# Patient Record
Sex: MALE | Race: BLACK OR AFRICAN AMERICAN | NOT HISPANIC OR LATINO | Employment: FULL TIME | URBAN - METROPOLITAN AREA
[De-identification: names, ages, dates, MRNs, and addresses within clinical notes are randomized per-mention and may not be internally consistent; named-entity substitution may affect disease eponyms.]

---

## 2018-10-08 ENCOUNTER — HOSPITAL ENCOUNTER (EMERGENCY)
Facility: HOSPITAL | Age: 38
Discharge: HOME | End: 2018-10-08
Attending: EMERGENCY MEDICINE | Admitting: EMERGENCY MEDICINE

## 2018-10-08 VITALS
HEART RATE: 70 BPM | RESPIRATION RATE: 16 BRPM | SYSTOLIC BLOOD PRESSURE: 160 MMHG | WEIGHT: 135 LBS | DIASTOLIC BLOOD PRESSURE: 88 MMHG | BODY MASS INDEX: 23.05 KG/M2 | OXYGEN SATURATION: 98 % | TEMPERATURE: 98.9 F | HEIGHT: 64 IN

## 2018-10-08 DIAGNOSIS — F10.20 ALCOHOLISM (HCC): ICD-10-CM

## 2018-10-08 DIAGNOSIS — F41.1 ANXIETY REACTION: Primary | ICD-10-CM

## 2018-10-08 LAB
ALBUMIN SERPL-MCNC: 4.42 G/DL (ref 3.2–4.8)
ALBUMIN/GLOB SERPL: 1.6 G/DL (ref 1.5–2.5)
ALP SERPL-CCNC: 56 U/L (ref 25–100)
ALT SERPL W P-5'-P-CCNC: 23 U/L (ref 7–40)
AMPHET+METHAMPHET UR QL: NEGATIVE
AMPHETAMINES UR QL: NEGATIVE
ANION GAP SERPL CALCULATED.3IONS-SCNC: 9 MMOL/L (ref 3–11)
AST SERPL-CCNC: 42 U/L (ref 0–33)
BARBITURATES UR QL SCN: NEGATIVE
BASOPHILS # BLD AUTO: 0.02 10*3/MM3 (ref 0–0.2)
BASOPHILS NFR BLD AUTO: 0.3 % (ref 0–1)
BENZODIAZ UR QL SCN: NEGATIVE
BILIRUB SERPL-MCNC: 0.9 MG/DL (ref 0.3–1.2)
BILIRUB UR QL STRIP: NEGATIVE
BUN BLD-MCNC: 9 MG/DL (ref 9–23)
BUN/CREAT SERPL: 9.9 (ref 7–25)
BUPRENORPHINE SERPL-MCNC: NEGATIVE NG/ML
CALCIUM SPEC-SCNC: 8.7 MG/DL (ref 8.7–10.4)
CANNABINOIDS SERPL QL: NEGATIVE
CHLORIDE SERPL-SCNC: 105 MMOL/L (ref 99–109)
CLARITY UR: CLEAR
CO2 SERPL-SCNC: 26 MMOL/L (ref 20–31)
COCAINE UR QL: NEGATIVE
COLOR UR: YELLOW
CREAT BLD-MCNC: 0.91 MG/DL (ref 0.6–1.3)
DEPRECATED RDW RBC AUTO: 43.2 FL (ref 37–54)
EOSINOPHIL # BLD AUTO: 0.02 10*3/MM3 (ref 0–0.3)
EOSINOPHIL NFR BLD AUTO: 0.3 % (ref 0–3)
ERYTHROCYTE [DISTWIDTH] IN BLOOD BY AUTOMATED COUNT: 12.2 % (ref 11.3–14.5)
ETHANOL BLD-MCNC: 12 MG/DL (ref 0–10)
GFR SERPL CREATININE-BSD FRML MDRD: 115 ML/MIN/1.73
GLOBULIN UR ELPH-MCNC: 2.8 GM/DL
GLUCOSE BLD-MCNC: 79 MG/DL (ref 70–100)
GLUCOSE UR STRIP-MCNC: ABNORMAL MG/DL
HCT VFR BLD AUTO: 42.5 % (ref 38.9–50.9)
HGB BLD-MCNC: 13.8 G/DL (ref 13.1–17.5)
HGB UR QL STRIP.AUTO: NEGATIVE
IMM GRANULOCYTES # BLD: 0.01 10*3/MM3 (ref 0–0.03)
IMM GRANULOCYTES NFR BLD: 0.2 % (ref 0–0.6)
KETONES UR QL STRIP: NEGATIVE
LEUKOCYTE ESTERASE UR QL STRIP.AUTO: NEGATIVE
LYMPHOCYTES # BLD AUTO: 1.85 10*3/MM3 (ref 0.6–4.8)
LYMPHOCYTES NFR BLD AUTO: 31.7 % (ref 24–44)
MAGNESIUM SERPL-MCNC: 1.7 MG/DL (ref 1.3–2.7)
MCH RBC QN AUTO: 31.3 PG (ref 27–31)
MCHC RBC AUTO-ENTMCNC: 32.5 G/DL (ref 32–36)
MCV RBC AUTO: 96.4 FL (ref 80–99)
METHADONE UR QL SCN: NEGATIVE
MONOCYTES # BLD AUTO: 0.37 10*3/MM3 (ref 0–1)
MONOCYTES NFR BLD AUTO: 6.3 % (ref 0–12)
NEUTROPHILS # BLD AUTO: 3.56 10*3/MM3 (ref 1.5–8.3)
NEUTROPHILS NFR BLD AUTO: 61.2 % (ref 41–71)
NITRITE UR QL STRIP: NEGATIVE
OPIATES UR QL: NEGATIVE
OXYCODONE UR QL SCN: NEGATIVE
PCP UR QL SCN: NEGATIVE
PH UR STRIP.AUTO: 6.5 [PH] (ref 5–8)
PLATELET # BLD AUTO: 183 10*3/MM3 (ref 150–450)
PMV BLD AUTO: 9.7 FL (ref 6–12)
POTASSIUM BLD-SCNC: 3.9 MMOL/L (ref 3.5–5.5)
PROPOXYPH UR QL: NEGATIVE
PROT SERPL-MCNC: 7.2 G/DL (ref 5.7–8.2)
PROT UR QL STRIP: NEGATIVE
RBC # BLD AUTO: 4.41 10*6/MM3 (ref 4.2–5.76)
SODIUM BLD-SCNC: 140 MMOL/L (ref 132–146)
SP GR UR STRIP: 1.02 (ref 1–1.03)
T4 FREE SERPL-MCNC: 1.01 NG/DL (ref 0.89–1.76)
TRICYCLICS UR QL SCN: NEGATIVE
TROPONIN I SERPL-MCNC: 0.02 NG/ML
TSH SERPL DL<=0.05 MIU/L-ACNC: 1.71 MIU/ML (ref 0.35–5.35)
UROBILINOGEN UR QL STRIP: ABNORMAL
VIT B12 BLD-MCNC: 313 PG/ML (ref 211–911)
WBC NRBC COR # BLD: 5.83 10*3/MM3 (ref 3.5–10.8)

## 2018-10-08 PROCEDURE — 82607 VITAMIN B-12: CPT | Performed by: EMERGENCY MEDICINE

## 2018-10-08 PROCEDURE — 84443 ASSAY THYROID STIM HORMONE: CPT | Performed by: EMERGENCY MEDICINE

## 2018-10-08 PROCEDURE — 81003 URINALYSIS AUTO W/O SCOPE: CPT | Performed by: EMERGENCY MEDICINE

## 2018-10-08 PROCEDURE — 83735 ASSAY OF MAGNESIUM: CPT | Performed by: EMERGENCY MEDICINE

## 2018-10-08 PROCEDURE — 84439 ASSAY OF FREE THYROXINE: CPT | Performed by: EMERGENCY MEDICINE

## 2018-10-08 PROCEDURE — 93005 ELECTROCARDIOGRAM TRACING: CPT | Performed by: EMERGENCY MEDICINE

## 2018-10-08 PROCEDURE — 85025 COMPLETE CBC W/AUTO DIFF WBC: CPT | Performed by: EMERGENCY MEDICINE

## 2018-10-08 PROCEDURE — 99283 EMERGENCY DEPT VISIT LOW MDM: CPT

## 2018-10-08 PROCEDURE — 96360 HYDRATION IV INFUSION INIT: CPT

## 2018-10-08 PROCEDURE — 80307 DRUG TEST PRSMV CHEM ANLYZR: CPT | Performed by: EMERGENCY MEDICINE

## 2018-10-08 PROCEDURE — 80306 DRUG TEST PRSMV INSTRMNT: CPT | Performed by: EMERGENCY MEDICINE

## 2018-10-08 PROCEDURE — 84484 ASSAY OF TROPONIN QUANT: CPT | Performed by: EMERGENCY MEDICINE

## 2018-10-08 PROCEDURE — 80053 COMPREHEN METABOLIC PANEL: CPT | Performed by: EMERGENCY MEDICINE

## 2018-10-08 RX ORDER — SODIUM CHLORIDE 0.9 % (FLUSH) 0.9 %
10 SYRINGE (ML) INJECTION AS NEEDED
Status: DISCONTINUED | OUTPATIENT
Start: 2018-10-08 | End: 2018-10-08 | Stop reason: HOSPADM

## 2018-10-08 RX ADMIN — SODIUM CHLORIDE 1000 ML: 9 INJECTION, SOLUTION INTRAVENOUS at 08:44

## 2018-10-08 NOTE — DISCHARGE INSTRUCTIONS
[facility]  [address] [place] [address]  [phone]      [facility]    [address]  [address]  [place] [address]  [phone] [address]  [address]  [place] [address]  [phone]      [facility] ([place] BCBS, UK HMO, UK PPO, UK EPO, UK Student Ins, Medicare and Medicaid)    [address]  3rd [place] [address]  [phone]       [facility] (4 locations) Accepts most insurance and Medicaid ([place] location only) No Medicare, [place] or [place]    [facility]  [address] [place] [address]  [phone]       [facility]  [address]  [address]  [place] [address]  [phone]      [facility]  [address]  [place] [address]  [phone]                     [facility]  [address]   [place], [place] [address] [phone]       [facility] (Preferred Medicaid Provider and some Major Insurance Plans) [phone]    [facility]  [address] [place] [address]       [facility]  [address] [place] [address]       [place]  [address] [place] [address]       [facility] (Most insurance and Medicaid)  [address]  [address]nd [place] [address]  [phone]      [facility]    [address]  [place] [address]  [phone]      [address] [facility]  [address]  [place] [address]       [facility]  [address] [place] [address] [phone]       [facility] 24-Hour Help Line [phone]      [facility]  [address] [address]  [phone]             [facility]  [address] [address]  [phone]             [facility]  [address] [id number]  [phone]                [facility]  [address] [facility] [address]  [place], [place] [address] [phone]       [facility]  [address] [place], [place] [address] [phone]      [facility]  [address] [place] [address]  [phone]              [facility]  [address]  [place], [place] [address] [phone]                  [facility]  [address] [place] [address] [phone]      [facility]  [address] [place] [address] [phone]                  [facility]  [address] [place] [address] [phone]      [facility]  [address] [place] [address] [phone]       [facility]  [address] [place] [address] [phone]      [facility]  [address] [place] [address] [phone]       [facility]  [address] [place] [address] [phone]

## 2018-10-08 NOTE — ED PROVIDER NOTES
"Subjective   James Swanson is a 37 y.o.male who presents to the ED with complaints of panic attacks. The patient says that he drinks approximately 4 twenty-five ounce beers every night. For the past couple of nights, he has been having panic attacks. During these episodes, he endorses shaking and breaking out into a cold sweat. Because of his associated drinking, his episodes are starting to frighten him. The patient  has never had these episodes before. He says he has been drinking this volume of beers nightly for several years in order to fall asleep after work. He indicates that he has nightmares and difficulty sleeping if he does not drink. Yesterday, he only drank one to two beers, but he says prior to that he had been drinking normally. Of note, he has recently switched from \"Bud Ice\" to \"Edge\" that contains 8 % alcohol within the past two months. He says his episodes are worsening. He also complains of having some left sided chest pain, but he denies any vomiting. He has no health problems and denies any history of depression. He says he has been stressed recently with the start of a new job. There are no other acute complaints at this time.         History provided by:  Patient  Illness   Location:  Generalized  Quality:  Panic attacks  Severity:  Moderate  Onset quality:  Gradual  Duration:  3 days  Timing:  Intermittent  Progression:  Worsening  Chronicity:  New  Context:  He drinks 100 ounces of beer a day.   Relieved by:  None tried  Worsened by:  Nothing  Ineffective treatments:  None tried  Associated symptoms: chest pain    Associated symptoms: no vomiting    Risk factors:  Alcoholism      Review of Systems   Constitutional: Positive for diaphoresis (during panic episodes).   Cardiovascular: Positive for chest pain.   Gastrointestinal: Negative for vomiting.   Neurological: Positive for tremors.   Psychiatric/Behavioral: The patient is nervous/anxious.    All other systems reviewed and are " negative.      History reviewed. No pertinent past medical history.    No Known Allergies    History reviewed. No pertinent surgical history.    History reviewed. No pertinent family history.    Social History     Social History   • Marital status: Single     Social History Main Topics   • Smoking status: Light Tobacco Smoker     Types: Cigars   • Smokeless tobacco: Never Used   • Drug use: No   • Sexual activity: Defer     Other Topics Concern   • Not on file         Objective   Physical Exam   Constitutional: He is oriented to person, place, and time. He appears well-developed and well-nourished. No distress.   HENT:   Head: Normocephalic and atraumatic.   Nose: Nose normal.   Eyes: Conjunctivae are normal. No scleral icterus.   Neck: Normal range of motion. Neck supple.   Cardiovascular: Normal rate, regular rhythm and normal heart sounds.    No murmur heard.  Pulmonary/Chest: Effort normal and breath sounds normal. No respiratory distress.   Abdominal: Soft. Bowel sounds are normal. There is no tenderness.   Musculoskeletal: Normal range of motion. He exhibits no edema.   Neurological: He is alert and oriented to person, place, and time.   The patient is tremulous.    Skin: Skin is warm and dry.   Psychiatric: His behavior is normal. His mood appears anxious.   Nursing note and vitals reviewed.      Procedures         ED Course     EKG SR.  Labs benign.  Better after fluids.  Patient stable on serial rechecks.  Discussed alcohol abuse and self-medication.  Discussed findings, concerns, plan of care, expected course, reasons to return and followup.                  MDM  Number of Diagnoses or Management Options  Alcoholism (CMS/[facility]):   Anxiety reaction:      Amount and/or Complexity of Data Reviewed  Clinical lab tests: ordered and reviewed  Independent visualization of images, tracings, or specimens: yes        Final diagnoses:   Anxiety reaction   Alcoholism (CMS/HCC)       Documentation assistance provided by  sergei Garcia.  Information recorded by the amnaibmariana was done at my direction and has been verified and validated by me.     Ke Garcia  10/08/18 0738       Ke Garcia  10/08/18 1115       Lee Mosley MD  10/08/18 1335

## 2020-12-30 ENCOUNTER — HOSPITAL ENCOUNTER (OUTPATIENT)
Dept: HOSPITAL 49 - FAS | Age: 40
Discharge: HOME | End: 2020-12-30
Payer: COMMERCIAL

## 2020-12-30 DIAGNOSIS — G47.00: ICD-10-CM
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
## 2020-12-30 DIAGNOSIS — F17.210: ICD-10-CM

## 2020-12-30 DIAGNOSIS — S00.05XA: Primary | ICD-10-CM

## 2020-12-30 DIAGNOSIS — K21.9: ICD-10-CM

## 2021-01-09 ENCOUNTER — HOSPITAL ENCOUNTER (EMERGENCY)
Dept: HOSPITAL 49 - FER | Age: 41
Discharge: HOME | End: 2021-01-09
Payer: COMMERCIAL

## 2021-01-09 DIAGNOSIS — Z88.0: ICD-10-CM

## 2021-01-09 DIAGNOSIS — L76.32: Primary | ICD-10-CM

## 2021-01-09 DIAGNOSIS — F17.200: ICD-10-CM
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
## 2021-07-07 ENCOUNTER — HOSPITAL ENCOUNTER (EMERGENCY)
Dept: HOSPITAL 49 - FER | Age: 41
Discharge: HOME | End: 2021-07-07
Payer: SELF-PAY

## 2021-07-07 DIAGNOSIS — F17.290: ICD-10-CM

## 2021-07-07 DIAGNOSIS — R42: ICD-10-CM

## 2021-07-07 DIAGNOSIS — R00.2: ICD-10-CM

## 2021-07-07 DIAGNOSIS — R06.02: ICD-10-CM

## 2021-07-07 DIAGNOSIS — Z88.0: ICD-10-CM

## 2021-07-07 DIAGNOSIS — R11.2: ICD-10-CM

## 2021-07-07 DIAGNOSIS — R07.89: Primary | ICD-10-CM

## 2021-07-07 LAB
ALBUMIN SERPL-MCNC: 3.9 G/DL (ref 3.4–5)
ALKALINE PHOSHATASE: 64 U/L (ref 46–116)
ALT SERPL-CCNC: 90 U/L (ref 16–63)
AST: 82 U/L (ref 15–37)
BASOPHIL: 1 % (ref 0–2)
BILIRUBIN - TOTAL: 0.5 MG/DL (ref 0.2–1)
BUN SERPL-MCNC: 9 MG/DL (ref 7–18)
BUN/CREAT RATIO (CALC): 10.2 RATIO
CHLORIDE: 99 MMOL/L (ref 98–107)
CO2 (BICARBONATE): 28 MMOL/L (ref 21–32)
CREATININE: 0.88 MG/DL (ref 0.67–1.17)
EOSINOPHIL: 1.8 % (ref 0–5)
GLOBULIN (CALCULATION): 4.3 G/DL
GLUCOSE SERPL-MCNC: 96 MG/DL (ref 74–106)
HCT: 38.5 % (ref 42–52)
HGB BLD-MCNC: 12.7 G/DL (ref 13.2–18)
LYMPHOCYTE: 47 % (ref 15–48)
MCH RBC QN AUTO: 31.8 PG (ref 25–31)
MCHC RBC AUTO-ENTMCNC: 33 G/DL (ref 32–36)
MCV: 96.3 FL (ref 78–100)
MONOCYTE: 10.7 % (ref 0–12)
MPV: 10.1 FL (ref 6–9.5)
NEUTROPHIL: 39.3 % (ref 41–80)
NRBC: 0
PLT: 144 K/UL (ref 150–400)
POTASSIUM: 3.5 MMOL/L (ref 3.5–5.1)
RBC MORPHOLOGY: NORMAL
RBC: 4 M/UL (ref 4.7–6)
RDW: 12.7 % (ref 11.5–14)
TOTAL PROTEIN: 8.2 G/DL (ref 6.4–8.2)
WBC: 5.1 K/UL (ref 4–10.5)

## 2021-08-08 ENCOUNTER — HOSPITAL ENCOUNTER (INPATIENT)
Facility: HOSPITAL | Age: 41
LOS: 4 days | Discharge: HOME | End: 2021-08-12
Attending: PSYCHIATRY & NEUROLOGY | Admitting: PSYCHIATRY & NEUROLOGY

## 2021-08-08 ENCOUNTER — HOSPITAL ENCOUNTER (EMERGENCY)
Dept: HOSPITAL 49 - FER | Age: 41
Discharge: LEFT BEFORE DISCHARGE | End: 2021-08-08
Payer: SELF-PAY

## 2021-08-08 DIAGNOSIS — Z53.1: Primary | ICD-10-CM

## 2021-08-08 PROBLEM — F29 PSYCHOSIS (HCC): Status: ACTIVE | Noted: 2021-08-08

## 2021-08-08 PROCEDURE — 63710000001 DIPHENHYDRAMINE PER 50 MG: Performed by: PSYCHIATRY & NEUROLOGY

## 2021-08-08 PROCEDURE — 25010000002 LORAZEPAM PER 2 MG: Performed by: PSYCHIATRY & NEUROLOGY

## 2021-08-08 PROCEDURE — 25010000002 DIPHENHYDRAMINE PER 50 MG: Performed by: PSYCHIATRY & NEUROLOGY

## 2021-08-08 PROCEDURE — 25010000002 HALOPERIDOL LACTATE PER 5 MG: Performed by: PSYCHIATRY & NEUROLOGY

## 2021-08-08 RX ORDER — ACETAMINOPHEN 325 MG/1
650 TABLET ORAL EVERY 4 HOURS PRN
Status: DISCONTINUED | OUTPATIENT
Start: 2021-08-08 | End: 2021-08-12 | Stop reason: HOSPADM

## 2021-08-08 RX ORDER — HYDROXYZINE HYDROCHLORIDE 25 MG/1
100 TABLET, FILM COATED ORAL NIGHTLY PRN
Status: DISCONTINUED | OUTPATIENT
Start: 2021-08-08 | End: 2021-08-12 | Stop reason: HOSPADM

## 2021-08-08 RX ORDER — DIPHENHYDRAMINE HYDROCHLORIDE 50 MG/ML
50 INJECTION INTRAMUSCULAR; INTRAVENOUS ONCE
Status: DISCONTINUED | OUTPATIENT
Start: 2021-08-08 | End: 2021-08-12 | Stop reason: HOSPADM

## 2021-08-08 RX ORDER — DIPHENHYDRAMINE HCL 50 MG
50 CAPSULE ORAL EVERY 4 HOURS PRN
Status: DISCONTINUED | OUTPATIENT
Start: 2021-08-08 | End: 2021-08-12 | Stop reason: HOSPADM

## 2021-08-08 RX ORDER — LORAZEPAM 2 MG/ML
2 INJECTION INTRAMUSCULAR EVERY 4 HOURS PRN
Status: DISCONTINUED | OUTPATIENT
Start: 2021-08-08 | End: 2021-08-12 | Stop reason: HOSPADM

## 2021-08-08 RX ORDER — HALOPERIDOL 5 MG/1
5 TABLET ORAL EVERY 4 HOURS PRN
Status: DISCONTINUED | OUTPATIENT
Start: 2021-08-08 | End: 2021-08-12 | Stop reason: HOSPADM

## 2021-08-08 RX ORDER — RISPERIDONE 2 MG/1
2 TABLET ORAL 2 TIMES DAILY
Status: DISCONTINUED | OUTPATIENT
Start: 2021-08-08 | End: 2021-08-11

## 2021-08-08 RX ORDER — LOPERAMIDE HYDROCHLORIDE 2 MG/1
2 CAPSULE ORAL
Status: DISCONTINUED | OUTPATIENT
Start: 2021-08-08 | End: 2021-08-12 | Stop reason: HOSPADM

## 2021-08-08 RX ORDER — LORAZEPAM 2 MG/1
2 TABLET ORAL EVERY 4 HOURS PRN
Status: DISCONTINUED | OUTPATIENT
Start: 2021-08-08 | End: 2021-08-12 | Stop reason: HOSPADM

## 2021-08-08 RX ORDER — ALUMINA, MAGNESIA, AND SIMETHICONE 2400; 2400; 240 MG/30ML; MG/30ML; MG/30ML
15 SUSPENSION ORAL EVERY 6 HOURS PRN
Status: DISCONTINUED | OUTPATIENT
Start: 2021-08-08 | End: 2021-08-12 | Stop reason: HOSPADM

## 2021-08-08 RX ORDER — HALOPERIDOL 5 MG/ML
5 INJECTION INTRAMUSCULAR EVERY 4 HOURS PRN
Status: DISCONTINUED | OUTPATIENT
Start: 2021-08-08 | End: 2021-08-12 | Stop reason: HOSPADM

## 2021-08-08 RX ORDER — HYDROXYZINE PAMOATE 50 MG/1
50 CAPSULE ORAL EVERY 6 HOURS PRN
Status: DISCONTINUED | OUTPATIENT
Start: 2021-08-08 | End: 2021-08-12 | Stop reason: HOSPADM

## 2021-08-08 RX ORDER — LORAZEPAM 2 MG/ML
2 INJECTION INTRAMUSCULAR ONCE
Status: DISCONTINUED | OUTPATIENT
Start: 2021-08-08 | End: 2021-08-12 | Stop reason: HOSPADM

## 2021-08-08 RX ORDER — HALOPERIDOL 5 MG/ML
5 INJECTION INTRAMUSCULAR ONCE
Status: DISCONTINUED | OUTPATIENT
Start: 2021-08-08 | End: 2021-08-12 | Stop reason: HOSPADM

## 2021-08-08 RX ORDER — DIPHENHYDRAMINE HYDROCHLORIDE 50 MG/ML
50 INJECTION INTRAMUSCULAR; INTRAVENOUS EVERY 4 HOURS PRN
Status: DISCONTINUED | OUTPATIENT
Start: 2021-08-08 | End: 2021-08-12 | Stop reason: HOSPADM

## 2021-08-08 RX ADMIN — HALOPERIDOL LACTATE 5 MG: 5 INJECTION, SOLUTION INTRAMUSCULAR at 16:25

## 2021-08-08 RX ADMIN — HALOPERIDOL 5 MG: 5 TABLET ORAL at 15:12

## 2021-08-08 RX ADMIN — LORAZEPAM 2 MG: 2 TABLET ORAL at 15:12

## 2021-08-08 RX ADMIN — DIPHENHYDRAMINE HYDROCHLORIDE 50 MG: 50 INJECTION, SOLUTION INTRAMUSCULAR; INTRAVENOUS at 16:23

## 2021-08-08 RX ADMIN — LORAZEPAM 2 MG: 2 INJECTION INTRAMUSCULAR; INTRAVENOUS at 16:25

## 2021-08-08 RX ADMIN — DIPHENHYDRAMINE HYDROCHLORIDE 50 MG: 50 CAPSULE ORAL at 15:12

## 2021-08-08 NOTE — PLAN OF CARE
Goal Outcome Evaluation:      NURSING ADMISSION NOTE: PT IS ADMITTED INVOLUNTARILY AS AN MIW UNDER THE CARE OF DR. SHAY.  PER MIW EVALUATOR PT WOULD OF A FEW MOMENTS OF CLARITY AND THEN BEGAN TO TALK ABOUT EIGHT ANIMALS WERE IN THE EVALUATION ROOM.  STATES HE TRIED TO RUN OUT OF THE BUILDING AND HAD TO BE TAKEN DOWN.  PT WAS VERY PARANOID.  TOLD EVALUATOR THAT HE DIDN'T KNOW WHO HAD THE DETONATOR TO THE BOMB, BUT HE WAS GOING TO CUT IT OUT OF HIM AS SOON AS HE CAN. THE EVALUATOR STATED SHE HAD NO MEDICAL HISTORY KNOWN ABOUT HIM AND HE HAD NEVER BEEN TO [facility] OR [facility] IN THE PAST.  ROMULO MARQUEZ IS THE SISTER-IN-LAW WHO CALLED THE POLICE [phone].  EVALUATOR STATES PT BEHAVIOR IS VERY UNPREDICTABLE.  PT ARRIVED ON UNIT VERY WATCHFUL AND FEARFUL.  ATTEMPTED TO LEAVE THE UNIT.  STATES  THERE WERE LIZZARDS AND BABIES WRAPPED IN A PILLOW CASE IN HIS ROOM.  PT LATER ASKED TO BE LOCKED IN HIS ROOM.  THEN STATED THERE WERE CHILDREN ON THE BED.  WAS NOT ABLE TO OBTAIN A URINE SPECIMEN ON THIS PT AS HE REFUSED.  PT DENIED DRUG USE.  STATES HE WILL DRINK BEER X1 MONTHLY, THEN STATES 2-3X. PT RECEIVED ATIVAN 2MG, [id number], AND BENADRYL 50MG PO AT 1512.  CONTINUED TO ESCALATE.  DR. SHAY NOTIFED AND RECEIVED ONE TIME ORDER FOR HALDOL 5MG IM, ATIVAN 2MG IM, AND BENADRYL 50MG IM AT 1625.  PT DID FALL ASLEEP AROUND 1700.  TEMP ELEVATED .6, B/P 168/109.  UNABLE TO COMPLETE ALL ITEMS ON ADMISSION ASSESSMENT DUE TO PT PSYCHOSIS, PARANOIA, AND ALTERED THOUGHTS.  PT DENIES SI, HI, AND HALLUCINATIONS; HOWEVER, HE IS RESPONDING TO INTERNAL STIMULI.  COLUMBIA SUICIDE RISK SCALE IS LOW.  WILL CONTINUE TO MONITOR AND FOLLOW CURRENT TX PLAN.

## 2021-08-09 LAB
ALBUMIN SERPL-MCNC: 3.8 G/DL (ref 3.5–5.2)
ALBUMIN/GLOB SERPL: 1.2 G/DL
ALP SERPL-CCNC: 45 U/L (ref 39–117)
ALT SERPL W P-5'-P-CCNC: 52 U/L (ref 1–41)
AMPHET+METHAMPHET UR QL: NEGATIVE
ANION GAP SERPL CALCULATED.3IONS-SCNC: 10.6 MMOL/L (ref 5–15)
AST SERPL-CCNC: 65 U/L (ref 1–40)
BACTERIA UR QL AUTO: ABNORMAL /HPF
BARBITURATES UR QL SCN: NEGATIVE
BASOPHILS # BLD AUTO: 0.04 10*3/MM3 (ref 0–0.2)
BASOPHILS NFR BLD AUTO: 0.8 % (ref 0–1.5)
BENZODIAZ UR QL SCN: NEGATIVE
BILIRUB SERPL-MCNC: 1 MG/DL (ref 0–1.2)
BILIRUB UR QL STRIP: ABNORMAL
BUN SERPL-MCNC: 9 MG/DL (ref 6–20)
BUN/CREAT SERPL: 8.2 (ref 7–25)
CALCIUM SPEC-SCNC: 9 MG/DL (ref 8.6–10.5)
CANNABINOIDS SERPL QL: NEGATIVE
CHLORIDE SERPL-SCNC: 105 MMOL/L (ref 98–107)
CLARITY UR: ABNORMAL
CO2 SERPL-SCNC: 25.4 MMOL/L (ref 22–29)
COCAINE UR QL: NEGATIVE
COLOR UR: ABNORMAL
CREAT SERPL-MCNC: 1.1 MG/DL (ref 0.76–1.27)
DEPRECATED RDW RBC AUTO: 42.5 FL (ref 37–54)
EOSINOPHIL # BLD AUTO: 0.05 10*3/MM3 (ref 0–0.4)
EOSINOPHIL NFR BLD AUTO: 1 % (ref 0.3–6.2)
ERYTHROCYTE [DISTWIDTH] IN BLOOD BY AUTOMATED COUNT: 11.9 % (ref 12.3–15.4)
ETHANOL BLD-MCNC: <10 MG/DL (ref 0–10)
ETHANOL UR QL: <0.01 %
GFR SERPL CREATININE-BSD FRML MDRD: 91 ML/MIN/1.73
GLOBULIN UR ELPH-MCNC: 3.1 GM/DL
GLUCOSE SERPL-MCNC: 90 MG/DL (ref 65–99)
GLUCOSE UR STRIP-MCNC: ABNORMAL MG/DL
HCT VFR BLD AUTO: 40.6 % (ref 37.5–51)
HGB BLD-MCNC: 12.8 G/DL (ref 13–17.7)
HGB UR QL STRIP.AUTO: NEGATIVE
HYALINE CASTS UR QL AUTO: ABNORMAL /LPF
IMM GRANULOCYTES # BLD AUTO: 0.01 10*3/MM3 (ref 0–0.05)
IMM GRANULOCYTES NFR BLD AUTO: 0.2 % (ref 0–0.5)
KETONES UR QL STRIP: ABNORMAL
LEUKOCYTE ESTERASE UR QL STRIP.AUTO: NEGATIVE
LYMPHOCYTES # BLD AUTO: 2.11 10*3/MM3 (ref 0.7–3.1)
LYMPHOCYTES NFR BLD AUTO: 40.8 % (ref 19.6–45.3)
MCH RBC QN AUTO: 30.5 PG (ref 26.6–33)
MCHC RBC AUTO-ENTMCNC: 31.5 G/DL (ref 31.5–35.7)
MCV RBC AUTO: 96.9 FL (ref 79–97)
METHADONE UR QL SCN: NEGATIVE
MONOCYTES # BLD AUTO: 0.47 10*3/MM3 (ref 0.1–0.9)
MONOCYTES NFR BLD AUTO: 9.1 % (ref 5–12)
MUCOUS THREADS URNS QL MICRO: ABNORMAL /HPF
NEUTROPHILS NFR BLD AUTO: 2.49 10*3/MM3 (ref 1.7–7)
NEUTROPHILS NFR BLD AUTO: 48.1 % (ref 42.7–76)
NITRITE UR QL STRIP: NEGATIVE
NRBC BLD AUTO-RTO: 0 /100 WBC (ref 0–0.2)
OPIATES UR QL: NEGATIVE
OXYCODONE UR QL SCN: NEGATIVE
PH UR STRIP.AUTO: 6 [PH] (ref 5–8)
PLATELET # BLD AUTO: 169 10*3/MM3 (ref 140–450)
PMV BLD AUTO: 10.3 FL (ref 6–12)
POTASSIUM SERPL-SCNC: 3.4 MMOL/L (ref 3.5–5.2)
PROT SERPL-MCNC: 6.9 G/DL (ref 6–8.5)
PROT UR QL STRIP: ABNORMAL
RBC # BLD AUTO: 4.19 10*6/MM3 (ref 4.14–5.8)
RBC # UR: ABNORMAL /HPF
REF LAB TEST METHOD: ABNORMAL
SODIUM SERPL-SCNC: 141 MMOL/L (ref 136–145)
SP GR UR STRIP: >1.03 (ref 1–1.03)
SQUAMOUS #/AREA URNS HPF: ABNORMAL /HPF
T4 FREE SERPL-MCNC: 1.77 NG/DL (ref 0.93–1.7)
TSH SERPL DL<=0.05 MIU/L-ACNC: 0.78 UIU/ML (ref 0.27–4.2)
UROBILINOGEN UR QL STRIP: ABNORMAL
WBC # BLD AUTO: 5.17 10*3/MM3 (ref 3.4–10.8)
WBC UR QL AUTO: ABNORMAL /HPF

## 2021-08-09 PROCEDURE — 80307 DRUG TEST PRSMV CHEM ANLYZR: CPT | Performed by: PSYCHIATRY & NEUROLOGY

## 2021-08-09 PROCEDURE — 82077 ASSAY SPEC XCP UR&BREATH IA: CPT | Performed by: PSYCHIATRY & NEUROLOGY

## 2021-08-09 PROCEDURE — 81001 URINALYSIS AUTO W/SCOPE: CPT | Performed by: PSYCHIATRY & NEUROLOGY

## 2021-08-09 PROCEDURE — 84443 ASSAY THYROID STIM HORMONE: CPT | Performed by: PSYCHIATRY & NEUROLOGY

## 2021-08-09 PROCEDURE — 80053 COMPREHEN METABOLIC PANEL: CPT | Performed by: PSYCHIATRY & NEUROLOGY

## 2021-08-09 PROCEDURE — 85025 COMPLETE CBC W/AUTO DIFF WBC: CPT | Performed by: PSYCHIATRY & NEUROLOGY

## 2021-08-09 PROCEDURE — 84439 ASSAY OF FREE THYROXINE: CPT | Performed by: PSYCHIATRY & NEUROLOGY

## 2021-08-09 RX ADMIN — RISPERIDONE 2 MG: 2 TABLET, FILM COATED ORAL at 09:29

## 2021-08-09 RX ADMIN — RISPERIDONE 2 MG: 2 TABLET, FILM COATED ORAL at 20:30
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
## 2021-08-09 NOTE — CASE MANAGEMENT/SOCIAL WORK
DATA:         Therapist met individually with patient this date to introduce role and to discuss hospitalization expectations. Patient agreeable.      Clinical Maneuvering/Intervention:     Therapist assisted patient in processing above session content; acknowledged and normalized patient’s thoughts, feelings, and concerns.  Discussed the therapist/patient relationship and explain the parameters and limitations of relative confidentiality.  Also discussed the importance active participation, and honesty to the treatment process.  Encouraged the patient to discuss/vent her feelings, frustrations, and fears concerning her ongoing medical issues and validated her feelings.   Spent the majority of the session building rapport.     Allowed patient to freely discuss issues without interruption or judgment. Provided safe, confidential environment to facilitate the development of positive therapeutic relationship and encourage open, honest communication.   Patient adamantly and convincingly denies current suicidal or homicidal ideation.   Therapist completed integrated summary, treatment plan, and initiated social history this date.  Therapist is strongly recommending a family session prior to discharge.          ASSESSMENT:      The patient is a 41 yo male from [place]. He was admitted as a MIW after intervention from [place] Police, he told them that there was a bomb inside his body.He is lying in his bed, is difficult to engage but does give limited history. He reports that he works at [employer] in [place]. He has been there about a month. He lives alone, reports he has a girlfriend of 6 years that lives in [place]. He has siblings in [place] and also in [place]. Parents are . He has a GED. He enjoys spending time with family and being outdoors. He denies any family or interpersonal problems. He has no children.No  history. Denies past or current abuse history.  He has a  "GED, no current legal problems but was in jail in the past for undisclosed reasons. He reports that there was a lot going on yesterday. Upon further questioning, he says that \"they were trying to make a movie and things were just out of hand. He denies any alcohol or drug issues, denies financial concerns. His main concern at this time is getting out of the hospital and being able to keep his job. He was provided numbers for his work but indicates that he was unable to reach the proper people. He  Was also given sister's number. He has no other phone numbers and states that he was unable to reach anyone. He is willing for family to have information and be notified that he is here, he wants to talk with anyone that may call here looking for him.  I called the number on chart for sister Arielle [phone]. Sister did not know he was here. She said that he was in jail for 10 years. He has a history of a lot of alcohol use. She said that she did not know anything was wrong or that he was hospitalized, unsure if family in [place] know where he is. She will reach out to brother in [place] on his behalf.      PLAN:       Patient to remain hospitalized this date.   Treatment team will focus efforts on stabilizing patient's acute symptoms while providing education on healthy coping and crisis management to reduce hospitalizations.   Patient requires daily psychiatrist evaluation and 24/7 nursing supervision to promote patient  safety.   Therapist will offer  individual sessions, encourage therapy group daily, family education, and appropriate referral.  "

## 2021-08-09 NOTE — PLAN OF CARE
"Goal Outcome Evaluation:  Plan of Care Reviewed With: patient  Patient Agreement with Plan of Care: unable to participate     Progress: improving  Patient has remains calm and sleeping most of shift.  Patient was able to participate only minimally due to drowsiness, and awakens to name.  Patient remains disoriented to time, place and situation.  Patient verbalized denial of suicidal thoughts, but does verbalize fear of unnamed persons, but does not elaborate on this when asked.  Patient has been monitored with continuous observation rounding by MHT and nurse.   Patient refused ordered labwork last evening and will attempt again this am.  Patient's sister-in-law called unit to offer her phone number and name in case additional information is needed :Remedios Swanson # [phone], and states anyone can call her anytime, as she is very concerned about patient's condition.  S-in-law verbalized that he has \"spiraled downward\" before, but it has never been this bad.  She states that she does not know if he has formally been diagnosed with any mental illness, as he has been in the [facility] and was treated there, she thinks for Schizophrenia and Bipolar disorder.  Patient states that yesterday, he sat out in the sun all day from 6am till 10 pm, and would not eat or drink anything or come inside when prompted. She states he then started talking about \"going crazy on someone, making threats and obtaining weapons to use and that he had to protect himself and acting very fearful\".  S-in-law reports that the patient has a history of \"bad alcoholism\", and is at times a heavy drinker with withdrawal symptoms is the past. She states he also has a brother . But he is not supportive of patient and thinks he is \"putting on an act.\"   Patient has been sleeping and calm, no S/S of withdrawal at this time.  Supportive care and safe environment provided.      "

## 2021-08-09 NOTE — PLAN OF CARE
Goal Outcome Evaluation:  Plan of Care Reviewed With: patient  Patient Agreement with Plan of Care: agrees   PATIENT ALERT AND ORIENTED AND CALM AND COOPERATIVE WITH STAFF. COMPLIANT WITH MEDICATIONS AND DENIES S/I, H/I OR HALLUCINATIONS. NO INAPPROPRIATE OR AGGRESSIVE BEHAVIOR NOTED.

## 2021-08-10 RX ORDER — GUAIFENESIN/DEXTROMETHORPHAN 100-10MG/5
10 SYRUP ORAL ONCE
Status: COMPLETED | OUTPATIENT
Start: 2021-08-10 | End: 2021-08-10

## 2021-08-10 RX ADMIN — GUAIFENESIN AND DEXTROMETHORPHAN 10 ML: 100; 10 SYRUP ORAL at 22:45

## 2021-08-10 RX ADMIN — RISPERIDONE 2 MG: 2 TABLET, FILM COATED ORAL at 21:30

## 2021-08-10 RX ADMIN — RISPERIDONE 2 MG: 2 TABLET, FILM COATED ORAL at 09:26

## 2021-08-10 NOTE — PLAN OF CARE
Goal Outcome Evaluation:  Plan of Care Reviewed With: patient  Patient Agreement with Plan of Care: agrees   PATIENT CONTINUES TO PROGRESS TOWARDS GOALS WITH NO INAPPROPRIATE OR AGGRESSIVE BEHAVIOR NOTED. COMPLIANT WITH MEDICATIONS AND DENIES S/I, H/I OR HALLUCINATIONS.

## 2021-08-10 NOTE — PLAN OF CARE
Goal Outcome Evaluation:  Plan of Care Reviewed With: patient  Patient Agreement with Plan of Care: agrees  Consent Given to Review Plan with: Sister Remedios, brother Balaji and sister Arielle  Progress: improving  Patient has shown improvement and is progressing towards outcomes as expected.  Patient has been coherent, Alert, oriented x4 and calm and cooperative.  Patient has made needs known appropriately, been cooperative with pm medications, and supportive care.  Patient states he does not remember anything that occurred on Sunday, and denies drinking any alcohol that day.  Patient states he would like to know if he has Schizophrenia, or Bipolar or something like that, as he states he has never had a formal diagnosis. Patient states his depression and anxiety are at 4/10, and main stressor is finding a job right now.  Emotional support and safe environment provided.

## 2021-08-10 NOTE — PROGRESS NOTES
"Psychiatry Progress Note    8/10/2021    Legal Status: [facility]    Chief Complaint: doing great    Subjective:  Patient is a 40 y.o. male was seen through telehealth; pt who was hospitalized for psychosis; the patient was discussed with TT members; the patient reported doing great, identified the reason for his admission as \"feeling anxious and unsafe\"; pt wasn't able to tell what medicine was he taking now but believed it was doing it job; pt denied paranoia, when specifically asked about voices, pt replied \"they stopped\".  The patient reported that prior to admission he drank a few beers and wondered of it could cause worsening of his symptoms.Pt reported drinking few beers couple times a week.    Pt denied side effect from current medication regiment.    Objective     Vital Signs    Vitals:    08/08/21 1717 08/09/21 0700 08/09/21 1900 08/10/21 0716   BP:  137/92 126/87 120/79   BP Location:  Left arm Left arm Left arm   Patient Position:  Sitting Sitting Lying   Pulse:  82 113 77   Resp:  16 16 20   Temp:  98.6 °F (37 °C) 97.9 °F (36.6 °C) 98 °F (36.7 °C)   TempSrc:  Oral Oral Oral   SpO2:  99% 98% 98%   Weight: 71.4 kg (157 lb 6.5 oz)      Height: 162.6 cm (64\")          Physical Exam:   General Appearance: alert, oriented x3  Hygiene:   fair  Gait & Station: intact  Musculoskeletal: wnl    Mental Status Exam:   Cooperation:  Cooperative  Eye Contact:  Good  Psychomotor Behavior:  Appropriate  Mood: great  Affect:  Little anxious  Speech:  Nl rate and rhythm  Thought Process:  linear  Associations: concrete  Thought Content:     Suicidal:  denied   Homicidal:  denied   Hallucinations:  \"stopped\"   Delusion:  Didn't endorse  Cognitive Functioning:   intact  Reliability:  fair  Insight:  some  Judgement:  limited  Impulse Control:  fair    Lab Results: Results source: EMR   Lab Results (last 24 hours)     ** No results found for the last 24 hours. **          Radiology Results:  Imaging Results (Last 24 Hours)     ** " No results found for the last 24 hours. **          Medicine:   Current Facility-Administered Medications:   •  acetaminophen (TYLENOL) tablet 650 mg, 650 mg, Oral, Q4H PRN, Juan Jose Laguna MD  •  aluminum-magnesium hydroxide-simethicone (MAALOX MAX) 400-400-40 MG/5ML suspension 15 mL, 15 mL, Oral, Q6H PRN, Juan Jose Laguna MD  •  diphenhydrAMINE (BENADRYL) capsule 50 mg, 50 mg, Oral, Q4H PRN, Juan Jose Laguna MD, 50 mg at 08/08/21 1512  •  diphenhydrAMINE (BENADRYL) injection 50 mg, 50 mg, Intramuscular, Q4H PRN, Juan Jose Laguna MD, 50 mg at 08/08/21 1623  •  diphenhydrAMINE (BENADRYL) injection 50 mg, 50 mg, Intramuscular, Once, Juan Jose Laguna MD  •  haloperidol (HALDOL) tablet 5 mg, 5 mg, Oral, Q4H PRN, Juan Jose Laguna MD, 5 mg at 08/08/21 1512  •  haloperidol lactate (HALDOL) injection 5 mg, 5 mg, Intramuscular, Q4H PRN, Juan Jose Laguna MD, 5 mg at 08/08/21 1625  •  haloperidol lactate (HALDOL) injection 5 mg, 5 mg, Intramuscular, Once, Juan Jose Laguna MD  •  hydrOXYzine (ATARAX) tablet 100 mg, 100 mg, Oral, Nightly PRN, Juan Jose Laguna MD  •  hydrOXYzine pamoate (VISTARIL) capsule 50 mg, 50 mg, Oral, Q6H PRN, Juan Jose Laguna MD  •  loperamide (IMODIUM) capsule 2 mg, 2 mg, Oral, Q2H PRN, Juan Jose Laguna MD  •  LORazepam (ATIVAN) injection 2 mg, 2 mg, Intramuscular, Q4H PRN, Juan Jose Laguna MD, 2 mg at 08/08/21 1625  •  LORazepam (ATIVAN) injection 2 mg, 2 mg, Intramuscular, Once, Juan Jose Laguna MD  •  LORazepam (ATIVAN) tablet 2 mg, 2 mg, Oral, Q4H PRN, Juan Jose Laguna MD, 2 mg at 08/08/21 1512  •  magnesium hydroxide (MILK OF MAGNESIA) suspension 2400 mg/10mL 10 mL, 10 mL, Oral, Daily PRN, Juan Jose Laguna MD  •  risperiDONE (risperDAL) tablet 2 mg, 2 mg, Oral, BID, Juan Jose Laguna MD, 2 mg at 08/10/21 0926    Diagnoses/Assessment:     Psychosis (CMS/[facility])      Treatment Plan:    1) Will continue care for the patient on the behavioral health unit at [facility] to ensure patient safety.  2) Will continue to provide treatment with the unit milieu,  activities, therapies and psychopharmacological management.  3) Continue current med regiment   4) Will continue discharge planning as appropriate for patient.    Treatment plan and medication risks and benefits discussed with: Patient    Janel Bailey MD  08/10/21 at 18:48 EDT

## 2021-08-11 RX ORDER — RISPERIDONE 3 MG/1
3 TABLET ORAL 2 TIMES DAILY
Status: DISCONTINUED | OUTPATIENT
Start: 2021-08-11 | End: 2021-08-12 | Stop reason: HOSPADM

## 2021-08-11 RX ADMIN — RISPERIDONE 2 MG: 2 TABLET, FILM COATED ORAL at 09:03

## 2021-08-11 RX ADMIN — HYDROXYZINE HYDROCHLORIDE 100 MG: 25 TABLET, FILM COATED ORAL at 21:30

## 2021-08-11 RX ADMIN — RISPERIDONE 3 MG: 3 TABLET, FILM COATED ORAL at 21:30

## 2021-08-11 NOTE — CASE MANAGEMENT/SOCIAL WORK
Met with patient today. Discussed improvements. Pt attributes improvement in symptoms due to medication. He states an intention to remain on medication. He is hopeful for discharge today. He wants to be able to return to work. He has been in touch with his sister by phone but no other relatives. He says that if he is discharged today that he will need assistance returning home. Pt arrived via [facility] process, will [facility]. [facility]. Pt is goal directed, future oriented and denying harm to self or others. His hold is up today and it does not appear that he meets criteria for further hold .Will discuss with physician.

## 2021-08-11 NOTE — PROGRESS NOTES
[facility]     Psychiatric Progress Note    Patient Name: James Swanson  : 1980  MRN: [id number]  Primary Care Physician:  Provider, No Known  Date of admission: 2021    Subjective   Subjective     Chief Complaint: psychosis upon admission  Doing better    Patient reports medications are tolerable and effective. He states to be feeling better.  He is relieved of AH to quite an extent but may feel better.  I asked him if he thinks medications can be titerated or add more medications?  He stated , yes ,that will work. He has been calm and compliant with medications and milieu. He is making progressive improvement.  He is willing to stay voluntarily for review of his medications.    Objective     Vitals:   Temp:  [97.7 °F (36.5 °C)-98.4 °F (36.9 °C)] 98.4 °F (36.9 °C)  Heart Rate:  [71-85] 71  Resp:  [16-20] 20  BP: (119-126)/(72-81) 126/72      Result Review    Result Review:  I have personally reviewed the results from the time of this admission to 2021 15:29 EDT and agree with these findings:  [x]  Laboratory  []  Microbiology  []  Radiology  []  EKG/Telemetry   []  Cardiology/Vascular   []  Pathology  []  Old records  []  Other:  Most notable findings include: No labs in the last 24 hours.    Mental Status Exam:   Cooperation:  Cooperative  Eye Contact:  Good  Psychomotor Behavior:  Appropriate  Mood: Euthymic  Affect:  constricted  Speech:  Normal  Thought Process:  Coherent  Associations: Goal Directed  Thought Content:     Normal   Suicidal:  None   Homicidal:  None   Hallucinations:  Auditory   Delusion:  None  Reliability:  fair  Insight:  Fair  Judgement:  Fair  Impulse Control:  Fair      Medicine:     Current Facility-Administered Medications:   •  acetaminophen (TYLENOL) tablet 650 mg, 650 mg, Oral, Q4H PRN, Juan Jose Laguna MD  •  aluminum-magnesium hydroxide-simethicone (MAALOX MAX) 400-400-40 MG/5ML suspension 15 mL, 15 mL, Oral, Q6H PRN, Juan Jose Laguna MD  •  diphenhydrAMINE (BENADRYL)  capsule 50 mg, 50 mg, Oral, Q4H PRN, Juan Jose Laguna MD, 50 mg at 08/08/21 1512  •  diphenhydrAMINE (BENADRYL) injection 50 mg, 50 mg, Intramuscular, Q4H PRN, Juan Jose Laguna MD, 50 mg at 08/08/21 1623  •  diphenhydrAMINE (BENADRYL) injection 50 mg, 50 mg, Intramuscular, Once, Juan Jose Laguna MD  •  haloperidol (HALDOL) tablet 5 mg, 5 mg, Oral, Q4H PRN, Juan Jose Laguna MD, 5 mg at 08/08/21 1512  •  haloperidol lactate (HALDOL) injection 5 mg, 5 mg, Intramuscular, Q4H PRN, Juan Jose Laguna MD, 5 mg at 08/08/21 1625  •  haloperidol lactate (HALDOL) injection 5 mg, 5 mg, Intramuscular, Once, Juan Jose Laguna MD  •  hydrOXYzine (ATARAX) tablet 100 mg, 100 mg, Oral, Nightly PRN, Juan Jose Laguna MD  •  hydrOXYzine pamoate (VISTARIL) capsule 50 mg, 50 mg, Oral, Q6H PRN, Juan Jose Laguna MD  •  loperamide (IMODIUM) capsule 2 mg, 2 mg, Oral, Q2H PRN, Juan Jose Laguna MD  •  LORazepam (ATIVAN) injection 2 mg, 2 mg, Intramuscular, Q4H PRN, Juan Jose Laguna MD, 2 mg at 08/08/21 1625  •  LORazepam (ATIVAN) injection 2 mg, 2 mg, Intramuscular, Once, Juan Jose Laguna MD  •  LORazepam (ATIVAN) tablet 2 mg, 2 mg, Oral, Q4H PRN, Juan Jose Laguna MD, 2 mg at 08/08/21 1512  •  magnesium hydroxide (MILK OF MAGNESIA) suspension 2400 mg/10mL 10 mL, 10 mL, Oral, Daily PRN, Juan Jose Laguna MD  •  risperiDONE (risperDAL) tablet 2 mg, 2 mg, Oral, BID, Juan Jose Laguna MD, 2 mg at 08/11/21 0903      Assessment/Plan   Assessment / Plan       Diagnoses/Assessment:     Psychosis (CMS/HCC)    Psychosis unspecified  Treatment Plan:    1) Will continue care for the patient on the behavioral health unit at [facility] to ensure patient safety.  2) Will continue to provide treatment with the unit milieu, activities, therapies and psychopharmacological management.  3) Patient to be placed on or continued on  Q15 minute checks  and stability precautions.  4) Pertinent medical issues: None  5) Will order following labs: None  6) Will make the following medication changes: Risperdal 3mg po  BID  7) Will continue discharge planning as appropriate for patient.    Treatment plan and medication risks and benefits discussed with: Patient    Michelle Nicole MD  08/11/21 at 15:29 EDT

## 2021-08-11 NOTE — PLAN OF CARE
Goal Outcome Evaluation:        Consent Given to Review Plan with: Patient    Patient was withdrawn to room for part of the shift, comes to dayroom but quite and guarded. Patient has been awake and alert and pleasant but flat affect.  Denies SI/HI, no A/V hallucinations, good eye contact, well-kept, denies any depression, rates anxiety 3. Patient contracts for safety, compliant with medications and engages well when questions asked. Patient stated has heard voices previously but none now. No further issues. Will continue to monitor.          Met with patient during PCP appointment to conduct outcomes MTM CMR. Patient did not have his medications with him but was able to tell me the names of his medications and how he takes them. During the visit, also talked about diabetes which patient takes Levemir. He states that his diabetes is now controlled and that he checks his fasting sugar and sometimes another sugar during the day. Discussed the importance of controlling his sugars to prevent complications of diabetes. Updated medications and removed: 
Miralax:  Patient does not take, only uses Senna-S Docusate:  Patient does not take, only uses Senna-S Senna:  Patient does not takes, only uses Senna-S Nystatin topical powder:  Patient does not use anymore MVI:  Patient not currently taking, but plans on restarting Reviewed with patient fall prevention as he is taking multiple medications that can increase fall risk: cyclobenzaprine, oxycodone. Patient denies adverse effects from medications Selena Chavez, PharmD, [facility]

## 2021-08-11 NOTE — PLAN OF CARE
Goal Outcome Evaluation:  Plan of Care Reviewed With: patient  Patient Agreement with Plan of Care: agrees  Consent Given to Review Plan with: Sister Remedios, brother Balaji and sister Arielle  Progress: improving  Patient has remains calm, pleasant, cooperative and easy to engage.  Patient is receptive of teaching and emotional support.  Patient states that although he would rather be home, he knows his family is worried about him and wants him to get help, so he is okay with staying.  Patient has been cooperative with pm medications, ate 100% of snack and has been compliant with assessments and treatment.  Patient continues to deny S/I, H/I and hallucinations and CFS.  Safe environment provided.

## 2021-08-12 VITALS
OXYGEN SATURATION: 100 % | RESPIRATION RATE: 16 BRPM | HEART RATE: 64 BPM | TEMPERATURE: 97.5 F | DIASTOLIC BLOOD PRESSURE: 78 MMHG | SYSTOLIC BLOOD PRESSURE: 134 MMHG | BODY MASS INDEX: 26.87 KG/M2 | WEIGHT: 157.41 LBS | HEIGHT: 64 IN

## 2021-08-12 RX ORDER — RISPERIDONE 3 MG/1
3 TABLET ORAL 2 TIMES DAILY
Qty: 60 TABLET | Refills: 1 | Status: SHIPPED | OUTPATIENT
Start: 2021-08-12 | End: 2022-09-19 | Stop reason: HOSPADM

## 2021-08-12 RX ORDER — HYDROXYZINE 50 MG/1
100 TABLET, FILM COATED ORAL NIGHTLY PRN
Qty: 60 TABLET | Refills: 1 | Status: SHIPPED | OUTPATIENT
Start: 2021-08-12 | End: 2022-09-19 | Stop reason: HOSPADM

## 2021-08-12 RX ADMIN — RISPERIDONE 3 MG: 3 TABLET, FILM COATED ORAL at 09:03

## 2021-08-12 NOTE — DISCHARGE SUMMARY
[facility]         DISCHARGE SUMMARY    Patient Name: James Swanson  : 1980  MRN: [id number]    Date of Admission: 2021  Date of Discharge: 2021  Primary Care Physician: Provider, No Known    Consults     No orders found from 7/10/2021 to 2021.          Presenting Problem:   Psychosis (CMS/HCC) [F29]    Active and Resolved Hospital Problems:  Active Hospital Problems    Diagnosis POA   • Psychosis (CMS/HCC) [F29] Yes      Resolved Hospital Problems   No resolved problems to display.         Hospital Course     Hospital Course:  James Swanson is a 40 y.o. male brought in the hospital for acute paranoia and agitation with hallucinations.  He was admitted on a mental Inquest [facility].  Patient on admission to the unit was very bizarre and agitated.  He required the agitation protocol twice just after being admitted to the unit.  After the second dose of the agitation protocol the patient did settle down and he slept throughout the night.  He remained somewhat somnolent the next morning was somewhat difficult to engage but did participate in interview.  Patient was noted to be somewhat paranoid limited eye contact.  Appeared to have some delusional and paranoid thinking.  He was very evasive and guarded.  Patient was rambling and disorganized in his speech.    He was initially started on Risperdal 2 mg twice daily and is tolerating medication well.  Continues to have some symptoms and his dose was increased to 3 mg twice daily.  Over the course of his hospital stay he did not require any other treatment with the agitation protocol.  His mood and affect have significantly improved.  Patient sleep is significantly improved.  He did use as needed Vistaril 100 mg for insomnia and this will be continued.  Patient is made significant progress.  On day of discharge he is calm, cooperative, engaging, makes very good eye contact and is able to carry on appropriate conversation.  He has no acute  agitation.    Patient has made significant turnaround and is significantly improved since time of admission.  Patient contributes a lot of his problems due to lack of poor sleep and sleep deprivation.  He was recently moved from second shift to first shift at his work.  He reports however his sleep patterns were not quite adjusted and he was making himself stay awake so he would not miss work.  He reports he thinks his lack of sleep contributed to some of his behaviors.  Is also noted that the medication is been significant benefit.  On day of discharge we discussed long-acting injectable of Risperdal but he reports he would like to follow-up with his outpatient provider and continue to take the medicine on his own accord by mouth and discussed long-acting injectable with his outpatient provider at a later date.  Patient pleasant cooperative.  There is no acute agitation.  He is denying any suicidal or homicidal ideation.  Patient reports that he feels much better and is ready to go home.  At this point is stabilized there is no criteria for involuntary hospitalization and has had significant improvement and ready for discharge and outpatient follow-up.    His tox cream was negative and substances are not a problem for him.      DISCHARGE Follow Up Recommendations for labs and diagnostics: Lipid and glucose testing      Day of Discharge     Vital Signs:  Temp:  [97.5 °F (36.4 °C)-98.3 °F (36.8 °C)] 97.5 °F (36.4 °C)  Heart Rate:  [64-72] 64  Resp:  [16-20] 16  BP: (120-134)/(73-78) 134/78      Pertinent  and/or Most Recent Results     LAB RESULTS:      Lab 08/09/21  0704   WBC 5.17   HEMOGLOBIN 12.8*   HEMATOCRIT 40.6   PLATELETS 169   NEUTROS ABS 2.49   IMMATURE GRANS (ABS) 0.01   LYMPHS ABS 2.11   MONOS ABS 0.47   EOS ABS 0.05   MCV 96.9         Lab 08/09/21  0704   SODIUM 141   POTASSIUM 3.4*   CHLORIDE 105   CO2 25.4   ANION GAP 10.6   BUN 9   CREATININE 1.10   GLUCOSE 90   CALCIUM 9.0   TSH 0.779         Lab  08/09/21  0704   TOTAL PROTEIN 6.9   ALBUMIN 3.80   GLOBULIN 3.1   ALT (SGPT) 52*   AST (SGOT) 65*   BILIRUBIN 1.0   ALK PHOS 45                     Brief Urine Lab Results  (Last result in the past 365 days)      Color   Clarity   Blood   Leuk Est   Nitrite   Protein   CREAT   Urine HCG        08/09/21 0710 Dark Yellow Cloudy Negative Negative Negative 30 mg/dL (1+)             Microbiology Results (last 10 days)     ** No results found for the last 240 hours. **                           Imaging Results (Last 7 Days)     ** No results found for the last 168 hours. **           Labs Pending at Discharge:        Discharge Details        Discharge Medications      New Medications      Instructions Start Date   hydrOXYzine 50 MG tablet  Commonly known as: ATARAX   100 mg, Oral, Nightly PRN      risperiDONE 3 MG tablet  Commonly known as: risperDAL   3 mg, Oral, 2 Times Daily             No Known Allergies      Discharge Disposition:  Home or Self Care    Diet:  Hospital:  Diet Order   Procedures   • Diet Regular         Discharge Activity:   Activity Instructions     Activity as Tolerated            Discharge Condition: Good    CODE STATUS:  Code Status and Medical Interventions:   Ordered at: 08/09/21 0803     Code Status:    CPR     Medical Interventions (Level of Support Prior to Arrest):    Full         No future appointments.    Additional Instructions for the Follow-ups that You Need to Schedule     Discharge Follow-up with Specified Provider: [facility]; 2 Weeks   As directed      To: [facility]    Follow Up: 2 Weeks               Time spent on Discharge including face to face service: 30 minutes    Electronically signed by Juan Jose Laguna MD, 08/12/21, 11:22 AM EDT.

## 2021-08-12 NOTE — PLAN OF CARE
Goal Outcome Evaluation:  Plan of Care Reviewed With: patient     Consent Given to Review Plan with: patient      Pt progressing toward goals. Pt reports hearing mumbling voices but cannot make out what they are saying. Will cont to monitor for safety and mood.

## 2021-08-12 NOTE — CASE MANAGEMENT/SOCIAL WORK
Reviewed in patient rounds, pt has made progress. He is pleasant and engaged with treatment, cites medication as helpful with symptoms. I contacted his sister yesterday, message left with no return call. Pt acknowledged follow up with [facility] and importance of medication compliance. [facility] assisting with cab voucher to home per MI protocol. Pt to remain off work until 08/17/21, note provided.

## 2021-08-12 NOTE — PLAN OF CARE
"Goal Outcome Evaluation:     Patient Agreement with Plan of Care: agrees     Progress: improving    Patient alert and oriented. Calm and cooperative. Denies SI and HI. Rates depression 0 and anxiety 3. Reports hearing some mumbling voices today. He describes these as when he \"thinks something it will say it.\" Will continue to monitor and provide safe environment.      "

## 2021-09-18 ENCOUNTER — HOSPITAL ENCOUNTER (EMERGENCY)
Dept: HOSPITAL 49 - FER | Age: 41
Discharge: HOME | End: 2021-09-18
Payer: SELF-PAY

## 2021-09-18 DIAGNOSIS — R19.7: Primary | ICD-10-CM

## 2021-11-19 ENCOUNTER — HOSPITAL ENCOUNTER (EMERGENCY)
Dept: HOSPITAL 49 - FER | Age: 41
Discharge: HOME | End: 2021-11-19
Payer: SELF-PAY

## 2021-11-19 DIAGNOSIS — R10.84: Primary | ICD-10-CM

## 2021-11-19 DIAGNOSIS — Z88.0: ICD-10-CM

## 2021-11-19 DIAGNOSIS — R11.0: ICD-10-CM

## 2021-11-19 DIAGNOSIS — F17.210: ICD-10-CM

## 2021-11-19 LAB
ALBUMIN SERPL-MCNC: 3.3 G/DL (ref 3.4–5)
ALKALINE PHOSHATASE: 80 U/L (ref 46–116)
ALT SERPL-CCNC: 24 U/L (ref 16–63)
AMPHETAMINES: NEGATIVE
AST: 17 U/L (ref 15–37)
BARBITURATES: NEGATIVE
BASOPHIL: 0.8 % (ref 0–2)
BILIRUBIN - TOTAL: 0.2 MG/DL (ref 0.2–1)
BILIRUBIN: NEGATIVE MG/DL
BLOOD: NEGATIVE ERY/UL
BUN SERPL-MCNC: 11 MG/DL (ref 7–18)
BUN/CREAT RATIO (CALC): 11 RATIO
CHLORIDE: 105 MMOL/L (ref 98–107)
CLARITY UR: CLEAR
CO2 (BICARBONATE): 23 MMOL/L (ref 21–32)
COLOR: YELLOW
CREATININE: 1 MG/DL (ref 0.67–1.17)
ECSTASY (MDMA): NEGATIVE
EOSINOPHIL: 2 % (ref 0–5)
GLOBULIN (CALCULATION): 3.8 G/DL
GLUCOSE (U): NORMAL MG/DL
GLUCOSE SERPL-MCNC: 103 MG/DL (ref 74–106)
HCT: 39.6 % (ref 42–52)
HGB BLD-MCNC: 12.7 G/DL (ref 13.2–18)
LEUKOCYTES: NEGATIVE LEU/UL
LYMPHOCYTE: 59.4 % (ref 15–48)
MARIJUANA (THC): NEGATIVE
MCH RBC QN AUTO: 29.5 PG (ref 25–31)
MCHC RBC AUTO-ENTMCNC: 32.1 G/DL (ref 32–36)
MCV: 92.1 FL (ref 78–100)
METHADONE: NEGATIVE
MONOCYTE: 6.5 % (ref 0–12)
MPV: 10.1 FL (ref 6–9.5)
NEUTROPHIL: 31 % (ref 41–80)
NITRITE: NEGATIVE MG/DL
NRBC: 0
OPIATES: NEGATIVE
OXYCODONE: NEGATIVE
PLT: 266 K/UL (ref 150–400)
POTASSIUM: 3.5 MMOL/L (ref 3.5–5.1)
PROTEIN: NEGATIVE MG/DL
RBC MORPHOLOGY: NORMAL
RBC: 4.3 M/UL (ref 4.7–6)
RDW: 12.9 % (ref 11.5–14)
SPECIFIC GRAVITY: 1.02 (ref 1–1.03)
TOTAL PROTEIN: 7.1 G/DL (ref 6.4–8.2)
UROBILINOGEN: 0.2 MG/DL (ref 0.2–1)
WBC: 6.5 K/UL (ref 4–10.5)

## 2021-12-23 ENCOUNTER — HOSPITAL ENCOUNTER (EMERGENCY)
Dept: HOSPITAL 49 - FER | Age: 41
Discharge: HOME | End: 2021-12-23
Payer: SELF-PAY

## 2021-12-23 DIAGNOSIS — Z20.822: ICD-10-CM

## 2021-12-23 DIAGNOSIS — R19.7: ICD-10-CM

## 2021-12-23 DIAGNOSIS — F17.200: ICD-10-CM

## 2021-12-23 DIAGNOSIS — Z88.0: ICD-10-CM

## 2021-12-23 DIAGNOSIS — R11.2: Primary | ICD-10-CM

## 2021-12-23 LAB
ALBUMIN SERPL-MCNC: 4 G/DL (ref 3.4–5)
ALKALINE PHOSHATASE: 62 U/L (ref 46–116)
ALT SERPL-CCNC: 36 U/L (ref 16–63)
AST: 35 U/L (ref 15–37)
BASOPHIL: 0.3 % (ref 0–2)
BILIRUBIN - TOTAL: 0.6 MG/DL (ref 0.2–1)
BUN SERPL-MCNC: 19 MG/DL (ref 7–18)
BUN/CREAT RATIO (CALC): 16.4 RATIO
CHLORIDE: 98 MMOL/L (ref 98–107)
CO2 (BICARBONATE): 25 MMOL/L (ref 21–32)
CREATININE: 1.16 MG/DL (ref 0.67–1.17)
EOSINOPHIL: 1.2 % (ref 0–5)
GLOBULIN (CALCULATION): 4 G/DL
GLUCOSE SERPL-MCNC: 112 MG/DL (ref 74–106)
HCT: 42.8 % (ref 42–52)
HGB BLD-MCNC: 14 G/DL (ref 13.2–18)
LIPASE: 143 U/L (ref 73–393)
LYMPHOCYTE: 15.1 % (ref 15–48)
MCH RBC QN AUTO: 29.6 PG (ref 25–31)
MCHC RBC AUTO-ENTMCNC: 32.7 G/DL (ref 32–36)
MCV: 90.5 FL (ref 78–100)
MONOCYTE: 3.6 % (ref 0–12)
MPV: 9.4 FL (ref 6–9.5)
NEUTROPHIL: 79.5 % (ref 41–80)
NRBC: 0
PLT: 237 K/UL (ref 150–400)
POTASSIUM: 3.7 MMOL/L (ref 3.5–5.1)
RBC MORPHOLOGY: NORMAL
RBC: 4.73 M/UL (ref 4.7–6)
RDW: 13.2 % (ref 11.5–14)
TOTAL PROTEIN: 8 G/DL (ref 6.4–8.2)
WBC: 6.7 K/UL (ref 4–10.5)

## 2021-12-23 PROCEDURE — U0002 COVID-19 LAB TEST NON-CDC: HCPCS

## 2022-01-25 ENCOUNTER — HOSPITAL ENCOUNTER (EMERGENCY)
Dept: HOSPITAL 49 - FER | Age: 42
Discharge: HOME | End: 2022-01-25
Payer: SELF-PAY

## 2022-01-25 DIAGNOSIS — Z88.6: ICD-10-CM

## 2022-01-25 DIAGNOSIS — A08.4: Primary | ICD-10-CM

## 2022-01-25 DIAGNOSIS — Z88.0: ICD-10-CM

## 2022-01-25 DIAGNOSIS — F17.200: ICD-10-CM

## 2022-01-25 DIAGNOSIS — Z20.822: ICD-10-CM

## 2022-01-25 LAB
ALBUMIN SERPL-MCNC: 3.8 G/DL (ref 3.4–5)
ALKALINE PHOSHATASE: 85 U/L (ref 46–116)
ALT SERPL-CCNC: 48 U/L (ref 16–63)
AST: 64 U/L (ref 15–37)
BASOPHIL: 0.7 % (ref 0–2)
BILIRUBIN - TOTAL: 0.3 MG/DL (ref 0.2–1)
BILIRUBIN: NEGATIVE MG/DL
BLOOD: NEGATIVE ERY/UL
BUN SERPL-MCNC: 11 MG/DL (ref 7–18)
BUN/CREAT RATIO (CALC): 11.5 RATIO
CHLORIDE: 100 MMOL/L (ref 98–107)
CLARITY UR: CLEAR
CO2 (BICARBONATE): 25 MMOL/L (ref 21–32)
COLOR: YELLOW
CORONAVIRUS 2019 SARS-COV-2: NEGATIVE
CREATININE: 0.96 MG/DL (ref 0.67–1.17)
EOSINOPHIL: 2 % (ref 0–5)
GLOBULIN (CALCULATION): 4.1 G/DL
GLUCOSE (U): NORMAL MG/DL
GLUCOSE SERPL-MCNC: 83 MG/DL (ref 74–106)
HCT: 41.6 % (ref 42–52)
HGB BLD-MCNC: 13.6 G/DL (ref 13.2–18)
INFLUENZA A NAA: NEGATIVE
LEUKOCYTES: NEGATIVE LEU/UL
LYMPHOCYTE: 57.9 % (ref 15–48)
MCH RBC QN AUTO: 30.3 PG (ref 25–31)
MCHC RBC AUTO-ENTMCNC: 32.7 G/DL (ref 32–36)
MCV: 92.7 FL (ref 78–100)
MONOCYTE: 5.6 % (ref 0–12)
MPV: 9.6 FL (ref 6–9.5)
NEUTROPHIL: 33.7 % (ref 41–80)
NITRITE: NEGATIVE MG/DL
NRBC: 0
PLT: 230 K/UL (ref 150–400)
POTASSIUM: 3.9 MMOL/L (ref 3.5–5.1)
PROTEIN: NEGATIVE MG/DL
RBC MORPHOLOGY: NORMAL
RBC: 4.49 M/UL (ref 4.7–6)
RDW: 13.2 % (ref 11.5–14)
SPECIFIC GRAVITY: 1.02 (ref 1–1.03)
TOTAL PROTEIN: 7.9 G/DL (ref 6.4–8.2)
UROBILINOGEN: 0.2 MG/DL (ref 0.2–1)
WBC: 6.9 K/UL (ref 4–10.5)

## 2022-01-25 PROCEDURE — U0002 COVID-19 LAB TEST NON-CDC: HCPCS

## 2022-02-16 ENCOUNTER — HOSPITAL ENCOUNTER (EMERGENCY)
Dept: HOSPITAL 49 - FER | Age: 42
Discharge: HOME | End: 2022-02-16
Payer: SELF-PAY

## 2022-02-16 DIAGNOSIS — R10.9: ICD-10-CM

## 2022-02-16 DIAGNOSIS — R19.7: ICD-10-CM

## 2022-02-16 DIAGNOSIS — R11.2: Primary | ICD-10-CM

## 2022-02-16 DIAGNOSIS — Z88.0: ICD-10-CM

## 2022-02-16 DIAGNOSIS — Z20.822: ICD-10-CM

## 2022-02-16 LAB
BASOPHIL: 1 % (ref 0–2)
BUN SERPL-MCNC: 9 MG/DL (ref 7–18)
BUN/CREAT RATIO (CALC): 9.1 RATIO
CHLORIDE: 108 MMOL/L (ref 98–107)
CO2 (BICARBONATE): 26 MMOL/L (ref 21–32)
CORONAVIRUS 2019 SARS-COV-2: NEGATIVE
CREATININE: 0.99 MG/DL (ref 0.67–1.17)
EOSINOPHIL: 1.7 % (ref 0–5)
GLUCOSE SERPL-MCNC: 96 MG/DL (ref 74–106)
HCT: 41 % (ref 42–52)
HGB BLD-MCNC: 13 G/DL (ref 13.2–18)
INFLUENZA A NAA: NEGATIVE
LYMPHOCYTE: 62.2 % (ref 15–48)
MCH RBC QN AUTO: 30.3 PG (ref 25–31)
MCHC RBC AUTO-ENTMCNC: 31.7 G/DL (ref 32–36)
MCV: 95.6 FL (ref 78–100)
MONOCYTE: 6.9 % (ref 0–12)
MPV: 10 FL (ref 6–9.5)
NEUTROPHIL: 28 % (ref 41–80)
NRBC: 0
PLT: 250 K/UL (ref 150–400)
POTASSIUM: 3.9 MMOL/L (ref 3.5–5.1)
RBC MORPHOLOGY: NORMAL
RBC: 4.29 M/UL (ref 4.7–6)
RDW: 13.2 % (ref 11.5–14)
WBC: 5.2 K/UL (ref 4–10.5)

## 2022-02-16 PROCEDURE — U0002 COVID-19 LAB TEST NON-CDC: HCPCS

## 2022-04-24 ENCOUNTER — HOSPITAL ENCOUNTER (EMERGENCY)
Dept: HOSPITAL 49 - FER | Age: 42
Discharge: HOME | End: 2022-04-24
Payer: SELF-PAY

## 2022-04-24 DIAGNOSIS — Z88.0: ICD-10-CM

## 2022-04-24 DIAGNOSIS — G89.29: ICD-10-CM

## 2022-04-24 DIAGNOSIS — R10.12: Primary | ICD-10-CM

## 2022-04-24 DIAGNOSIS — R10.32: ICD-10-CM

## 2022-04-24 DIAGNOSIS — I10: ICD-10-CM

## 2022-04-24 LAB
ALBUMIN SERPL-MCNC: 3.8 G/DL (ref 3.4–5)
ALKALINE PHOSHATASE: 69 U/L (ref 46–116)
ALT SERPL-CCNC: 22 U/L (ref 16–63)
AST: 25 U/L (ref 15–37)
BASOPHIL: 0.8 % (ref 0–2)
BILIRUBIN - TOTAL: 0.4 MG/DL (ref 0.2–1)
BILIRUBIN: NEGATIVE MG/DL
BLOOD: NEGATIVE ERY/UL
BUN SERPL-MCNC: 17 MG/DL (ref 7–18)
BUN/CREAT RATIO (CALC): 14.7 RATIO
CHLORIDE: 105 MMOL/L (ref 98–107)
CLARITY UR: CLEAR
CO2 (BICARBONATE): 28 MMOL/L (ref 21–32)
COLOR: YELLOW
CREATININE: 1.16 MG/DL (ref 0.67–1.17)
EOSINOPHIL: 1.4 % (ref 0–5)
GLOBULIN (CALCULATION): 4.2 G/DL
GLUCOSE (U): NORMAL MG/DL
GLUCOSE SERPL-MCNC: 88 MG/DL (ref 74–106)
HCT: 42 % (ref 42–52)
HGB BLD-MCNC: 13.4 G/DL (ref 13.2–18)
LEUKOCYTES: NEGATIVE LEU/UL
LIPASE: 174 U/L (ref 73–393)
LYMPHOCYTE: 53.6 % (ref 15–48)
MCH RBC QN AUTO: 30.5 PG (ref 25–31)
MCHC RBC AUTO-ENTMCNC: 31.9 G/DL (ref 32–36)
MCV: 95.5 FL (ref 78–100)
MONOCYTE: 6.9 % (ref 0–12)
MPV: 9.6 FL (ref 6–9.5)
NEUTROPHIL: 37 % (ref 41–80)
NITRITE: NEGATIVE MG/DL
NRBC: 0
PLT: 282 K/UL (ref 150–400)
POTASSIUM: 4.1 MMOL/L (ref 3.5–5.1)
PROTEIN: NEGATIVE MG/DL
RBC MORPHOLOGY: NORMAL
RBC: 4.4 M/UL (ref 4.7–6)
RDW: 13.2 % (ref 11.5–14)
SPECIFIC GRAVITY: 1.02 (ref 1–1.03)
TOTAL PROTEIN: 8 G/DL (ref 6.4–8.2)
UROBILINOGEN: 0.2 MG/DL (ref 0.2–1)
WBC: 6.4 K/UL (ref 4–10.5)

## 2022-09-14 ENCOUNTER — HOSPITAL ENCOUNTER (INPATIENT)
Facility: HOSPITAL | Age: 42
LOS: 5 days | Discharge: HOME | End: 2022-09-19
Attending: PSYCHIATRY & NEUROLOGY | Admitting: PSYCHIATRY & NEUROLOGY

## 2022-09-14 PROCEDURE — 25010000002 DIPHENHYDRAMINE PER 50 MG: Performed by: PSYCHIATRY & NEUROLOGY

## 2022-09-14 PROCEDURE — 25010000002 HALOPERIDOL LACTATE PER 5 MG: Performed by: PSYCHIATRY & NEUROLOGY

## 2022-09-14 PROCEDURE — 25010000002 LORAZEPAM PER 2 MG: Performed by: PSYCHIATRY & NEUROLOGY

## 2022-09-14 RX ORDER — RISPERIDONE 3 MG/1
3 TABLET ORAL ONCE
Status: DISCONTINUED | OUTPATIENT
Start: 2022-09-14 | End: 2022-09-19 | Stop reason: HOSPADM

## 2022-09-14 RX ORDER — DIPHENHYDRAMINE HYDROCHLORIDE 50 MG/ML
50 INJECTION INTRAMUSCULAR; INTRAVENOUS EVERY 4 HOURS PRN
Status: DISCONTINUED | OUTPATIENT
Start: 2022-09-14 | End: 2022-09-19 | Stop reason: HOSPADM

## 2022-09-14 RX ORDER — TRAZODONE HYDROCHLORIDE 50 MG/1
100 TABLET ORAL NIGHTLY PRN
Status: DISCONTINUED | OUTPATIENT
Start: 2022-09-14 | End: 2022-09-19 | Stop reason: HOSPADM

## 2022-09-14 RX ORDER — HALOPERIDOL 5 MG/1
5 TABLET ORAL EVERY 4 HOURS PRN
Status: DISCONTINUED | OUTPATIENT
Start: 2022-09-14 | End: 2022-09-19 | Stop reason: HOSPADM

## 2022-09-14 RX ORDER — RISPERIDONE 3 MG/1
3 TABLET ORAL 2 TIMES DAILY
Status: DISCONTINUED | OUTPATIENT
Start: 2022-09-14 | End: 2022-09-19 | Stop reason: HOSPADM

## 2022-09-14 RX ORDER — NICOTINE 21 MG/24HR
1 PATCH, TRANSDERMAL 24 HOURS TRANSDERMAL
Status: DISCONTINUED | OUTPATIENT
Start: 2022-09-14 | End: 2022-09-19 | Stop reason: HOSPADM

## 2022-09-14 RX ORDER — DIPHENHYDRAMINE HCL 50 MG
50 CAPSULE ORAL EVERY 4 HOURS PRN
Status: DISCONTINUED | OUTPATIENT
Start: 2022-09-14 | End: 2022-09-19 | Stop reason: HOSPADM

## 2022-09-14 RX ORDER — LOPERAMIDE HYDROCHLORIDE 2 MG/1
2 CAPSULE ORAL
Status: DISCONTINUED | OUTPATIENT
Start: 2022-09-14 | End: 2022-09-19 | Stop reason: HOSPADM

## 2022-09-14 RX ORDER — LORAZEPAM 2 MG/1
2 TABLET ORAL EVERY 4 HOURS PRN
Status: DISCONTINUED | OUTPATIENT
Start: 2022-09-14 | End: 2022-09-19 | Stop reason: HOSPADM

## 2022-09-14 RX ORDER — LORAZEPAM 2 MG/ML
2 INJECTION INTRAMUSCULAR EVERY 4 HOURS PRN
Status: DISCONTINUED | OUTPATIENT
Start: 2022-09-14 | End: 2022-09-19 | Stop reason: HOSPADM

## 2022-09-14 RX ORDER — HALOPERIDOL 5 MG/ML
5 INJECTION INTRAMUSCULAR EVERY 4 HOURS PRN
Status: DISCONTINUED | OUTPATIENT
Start: 2022-09-14 | End: 2022-09-19 | Stop reason: HOSPADM

## 2022-09-14 RX ORDER — ALUMINA, MAGNESIA, AND SIMETHICONE 2400; 2400; 240 MG/30ML; MG/30ML; MG/30ML
15 SUSPENSION ORAL EVERY 6 HOURS PRN
Status: DISCONTINUED | OUTPATIENT
Start: 2022-09-14 | End: 2022-09-19 | Stop reason: HOSPADM

## 2022-09-14 RX ORDER — HYDROXYZINE PAMOATE 50 MG/1
50 CAPSULE ORAL EVERY 6 HOURS PRN
Status: DISCONTINUED | OUTPATIENT
Start: 2022-09-14 | End: 2022-09-19 | Stop reason: HOSPADM

## 2022-09-14 RX ORDER — ACETAMINOPHEN 325 MG/1
650 TABLET ORAL EVERY 4 HOURS PRN
Status: DISCONTINUED | OUTPATIENT
Start: 2022-09-14 | End: 2022-09-19 | Stop reason: HOSPADM

## 2022-09-14 RX ADMIN — DIPHENHYDRAMINE HYDROCHLORIDE 50 MG: 50 INJECTION, SOLUTION INTRAMUSCULAR; INTRAVENOUS at 22:31

## 2022-09-14 RX ADMIN — HALOPERIDOL LACTATE 5 MG: 5 INJECTION, SOLUTION INTRAMUSCULAR at 22:31

## 2022-09-14 RX ADMIN — LORAZEPAM 2 MG: 2 INJECTION INTRAMUSCULAR; INTRAVENOUS at 22:31

## 2022-09-14 NOTE — NURSING NOTE
41 YEARS OLD MALE ADMITTED TO UNIT ON [facility] FROM [facility]. PATIENT ARRIVED ON UNIT AT 1740 ESCORTED BY [place] [employer] AND [employer] OFFICERS. PER [facility]W EVALUATOR PATIENT EXTREMELY PARANOID AND HAS NOT SLEPT FOR 5 DAYS. PER BROTHER PATIENT EXTREMELY PARANOID AND AFRAID TO GO INSIDE HIS APARTMENT. PATIENT GUARDED AND VERY DIFFICULT TO ENGAGE DURING ADMISSION PROCESS. PATIENT DENIES ANY DEPRESSION, ANXIETY, HOPELESSNESS, OR SUICIDAL THOUGHTS.  WILL CONTINUE O MONITOR FOR CHANGES IN MOOD OR BEHAVIOR.

## 2022-09-15 LAB
ALBUMIN SERPL-MCNC: 4.6 G/DL (ref 3.5–5.2)
ALBUMIN/GLOB SERPL: 1.3 G/DL
ALP SERPL-CCNC: 59 U/L (ref 39–117)
ALT SERPL W P-5'-P-CCNC: 304 U/L (ref 1–41)
ANION GAP SERPL CALCULATED.3IONS-SCNC: 16.4 MMOL/L (ref 5–15)
AST SERPL-CCNC: 264 U/L (ref 1–40)
BASOPHILS # BLD AUTO: 0.02 10*3/MM3 (ref 0–0.2)
BASOPHILS NFR BLD AUTO: 0.2 % (ref 0–1.5)
BILIRUB SERPL-MCNC: 1.4 MG/DL (ref 0–1.2)
BUN SERPL-MCNC: 23 MG/DL (ref 6–20)
BUN/CREAT SERPL: 12 (ref 7–25)
CALCIUM SPEC-SCNC: 10 MG/DL (ref 8.6–10.5)
CHLORIDE SERPL-SCNC: 99 MMOL/L (ref 98–107)
CO2 SERPL-SCNC: 23.6 MMOL/L (ref 22–29)
CREAT SERPL-MCNC: 1.92 MG/DL (ref 0.76–1.27)
DEPRECATED RDW RBC AUTO: 44.5 FL (ref 37–54)
EGFRCR SERPLBLD CKD-EPI 2021: 44.9 ML/MIN/1.73
EOSINOPHIL # BLD AUTO: 0 10*3/MM3 (ref 0–0.4)
EOSINOPHIL NFR BLD AUTO: 0 % (ref 0.3–6.2)
ERYTHROCYTE [DISTWIDTH] IN BLOOD BY AUTOMATED COUNT: 12.6 % (ref 12.3–15.4)
ETHANOL BLD-MCNC: <10 MG/DL (ref 0–10)
ETHANOL UR QL: <0.01 %
GLOBULIN UR ELPH-MCNC: 3.5 GM/DL
GLUCOSE SERPL-MCNC: 129 MG/DL (ref 65–99)
HCT VFR BLD AUTO: 41.3 % (ref 37.5–51)
HGB BLD-MCNC: 13.2 G/DL (ref 13–17.7)
IMM GRANULOCYTES # BLD AUTO: 0.03 10*3/MM3 (ref 0–0.05)
IMM GRANULOCYTES NFR BLD AUTO: 0.3 % (ref 0–0.5)
LYMPHOCYTES # BLD AUTO: 0.93 10*3/MM3 (ref 0.7–3.1)
LYMPHOCYTES NFR BLD AUTO: 9.5 % (ref 19.6–45.3)
MCH RBC QN AUTO: 30.4 PG (ref 26.6–33)
MCHC RBC AUTO-ENTMCNC: 32 G/DL (ref 31.5–35.7)
MCV RBC AUTO: 95.2 FL (ref 79–97)
MONOCYTES # BLD AUTO: 0.76 10*3/MM3 (ref 0.1–0.9)
MONOCYTES NFR BLD AUTO: 7.8 % (ref 5–12)
NEUTROPHILS NFR BLD AUTO: 8.06 10*3/MM3 (ref 1.7–7)
NEUTROPHILS NFR BLD AUTO: 82.2 % (ref 42.7–76)
NRBC BLD AUTO-RTO: 0 /100 WBC (ref 0–0.2)
PLATELET # BLD AUTO: 179 10*3/MM3 (ref 140–450)
PMV BLD AUTO: 10 FL (ref 6–12)
POTASSIUM SERPL-SCNC: 3.9 MMOL/L (ref 3.5–5.2)
PROT SERPL-MCNC: 8.1 G/DL (ref 6–8.5)
RBC # BLD AUTO: 4.34 10*6/MM3 (ref 4.14–5.8)
SODIUM SERPL-SCNC: 139 MMOL/L (ref 136–145)
T4 FREE SERPL-MCNC: 1.74 NG/DL (ref 0.93–1.7)
TSH SERPL DL<=0.05 MIU/L-ACNC: 1.3 UIU/ML (ref 0.27–4.2)
WBC NRBC COR # BLD: 9.8 10*3/MM3 (ref 3.4–10.8)

## 2022-09-15 PROCEDURE — 84443 ASSAY THYROID STIM HORMONE: CPT | Performed by: PSYCHIATRY & NEUROLOGY

## 2022-09-15 PROCEDURE — 85025 COMPLETE CBC W/AUTO DIFF WBC: CPT | Performed by: PSYCHIATRY & NEUROLOGY

## 2022-09-15 PROCEDURE — 82077 ASSAY SPEC XCP UR&BREATH IA: CPT | Performed by: PSYCHIATRY & NEUROLOGY

## 2022-09-15 PROCEDURE — 80053 COMPREHEN METABOLIC PANEL: CPT | Performed by: PSYCHIATRY & NEUROLOGY

## 2022-09-15 PROCEDURE — 84439 ASSAY OF FREE THYROXINE: CPT | Performed by: PSYCHIATRY & NEUROLOGY

## 2022-09-15 RX ADMIN — TRAZODONE HYDROCHLORIDE 100 MG: 50 TABLET ORAL at 23:25

## 2022-09-15 RX ADMIN — RISPERIDONE 3 MG: 3 TABLET ORAL at 23:26

## 2022-09-15 NOTE — NURSING NOTE
"Pt ROM and circulation assessed. Pt still tolerating restraints well. Pt still has eyes wide open, and is now answering some questions, as he was withdrawn to self and only answered one to two word answers to questions. Pt asked if he thought he could go to his room and lay down, pt responded \"no.\"  Pt then said, \"I am fine right here.\" Pt would not make eye contact with this nurse. This nurse asked the pt if he would feel like he could be safe if out of restraints. Pt stated, \"No I am fine here.\" Pt will cont to be monitored for safety and mood.   "

## 2022-09-15 NOTE — NURSING NOTE
Pt restraints discontinued, pt tolerated well. Pt able to process with this nurse. Pt agreed to plan to go to bathroom, eat/drink and lay down to sleep. Pt able to make conversation, even asking why he was here and how long he would have to be here. Pt able to make needs known. Pt has no complaints of pain or distress related to restraint use at this time. Pt calm, relaxed posture noted before leaving patient in his room while snacking. Will cont to monitor for safety and mood.
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
## 2022-09-15 NOTE — PLAN OF CARE
Goal Outcome Evaluation:  Plan of Care Reviewed With: patient  Patient Agreement with Plan of Care: agrees      Pt. Did wake up until lunchtime and ate in his room. Pt. Was very evasive and hesitant to answer questions. Pt. Did deny any si/hi, though was very dismissive and ignored staff when asked if he was hearing voices or paranoid. Pt. Affect was flat and hard to engage, did refuse any medication. Will con't ot monitor and provide a safe environment.

## 2022-09-15 NOTE — PLAN OF CARE
"Goal Outcome Evaluation:  Plan of Care Reviewed With: patient  Patient Agreement with Plan of Care: agrees     At beginning of shift and at med pass, withdrawn to room, refused to engage other than to tell this nurse \"You are excused,\" and, \"Ok.\" Unable to complete assessment without pt. Input.    At 2225, pt grabbed the arm of  during his round, MHT and this nurse stepped into the marcano and tried to engage pt. And redirect him. Pt began to grab onto arm of MHT and stated, \"Lets go.\" Pt walked toward day room. Pt redirected multiple times and offered food, drink, and diversional activity. Pt then offered medication for agitation as he was getting more agitated as he walked and wanted to be escorted out. Pt administered prn agitation medication at 2231, pt laid on bed until he thought staff was gone. Pt then got up and started to go around staff again, pt would not redirect and started to charge security staff. Pt placed in non-locking restraints in quiet room after being escorted to the quiet room by security. 2250, order and face to face completed with Dr. Nicole and . Pt tolerated well. VS elevated related to agitation. Will cont to monitor for safety and mood.   "

## 2022-09-16 LAB
ALBUMIN SERPL-MCNC: 3.5 G/DL (ref 3.5–5.2)
ALBUMIN/GLOB SERPL: 1.1 G/DL
ALP SERPL-CCNC: 63 U/L (ref 39–117)
ALT SERPL W P-5'-P-CCNC: 160 U/L (ref 1–41)
ANION GAP SERPL CALCULATED.3IONS-SCNC: 9.9 MMOL/L (ref 5–15)
AST SERPL-CCNC: 152 U/L (ref 1–40)
BILIRUB SERPL-MCNC: 0.5 MG/DL (ref 0–1.2)
BUN SERPL-MCNC: 15 MG/DL (ref 6–20)
BUN/CREAT SERPL: 11.5 (ref 7–25)
CALCIUM SPEC-SCNC: 9.2 MG/DL (ref 8.6–10.5)
CHLORIDE SERPL-SCNC: 100 MMOL/L (ref 98–107)
CO2 SERPL-SCNC: 26.1 MMOL/L (ref 22–29)
CREAT SERPL-MCNC: 1.3 MG/DL (ref 0.76–1.27)
EGFRCR SERPLBLD CKD-EPI 2021: 71.7 ML/MIN/1.73
GLOBULIN UR ELPH-MCNC: 3.2 GM/DL
GLUCOSE SERPL-MCNC: 120 MG/DL (ref 65–99)
POTASSIUM SERPL-SCNC: 3.8 MMOL/L (ref 3.5–5.2)
PROT SERPL-MCNC: 6.7 G/DL (ref 6–8.5)
SODIUM SERPL-SCNC: 136 MMOL/L (ref 136–145)

## 2022-09-16 PROCEDURE — 80053 COMPREHEN METABOLIC PANEL: CPT | Performed by: PSYCHIATRY & NEUROLOGY

## 2022-09-16 RX ADMIN — RISPERIDONE 3 MG: 3 TABLET ORAL at 20:25

## 2022-09-16 RX ADMIN — TRAZODONE HYDROCHLORIDE 100 MG: 50 TABLET ORAL at 20:24

## 2022-09-16 RX ADMIN — RISPERIDONE 3 MG: 3 TABLET ORAL at 08:52

## 2022-09-16 NOTE — PLAN OF CARE
Goal Outcome Evaluation:  Plan of Care Reviewed With: patient  Patient Agreement with Plan of Care: agrees         Pt has been withdrawn to room.  He is calm and cooperative.  He denies SI/HI, AVH and contracts for safety.  He rates anxiety and depression 7.  He is able to make his needs known.  Will continue to monitor.

## 2022-09-16 NOTE — PROGRESS NOTES
Patient was seen and evaluated today by me. His chart was reviewed and his case was discussed with the treatment team.     Nursing staff reports patient has been withdrawn to room but calm and cooperative.      INTERVIEW:     Patient reports some improvement in his mood. He demonstrates limited insight into current treatment but remains calm during interview. He denies any side effects from his medication. He denies any suicidal ideation, homicidal ideation, or hallucinations. He reports fair appetite and fair sleep. He denies any physical complaints currently. He appears in no distress.       MENTAL STATUS EXAMINATION:     Orientation: Sleeping, but awakens easily with verbal stimulation. When awake he is alert, oriented to person, place, and time     Psychomotor activity: Normal psychomotor activity, no abnormal movements noted.      Appearance: Fair grooming, fair hygiene     Behavior: Cooperative     Speech: Clear, fluent     Mood: Improving     Affect: Constricted, reactive     Thought process: Goal-directed     Thought content: Appears generally reality-based; no overtly bizarre or paranoid thoughts voiced     Suicidal ideation: Patient denies     Homicidal ideation: Patient denies     Auditory/Visual hallucinations: Patient denies      Memory: Fair based on recall of recent and past information.     Insight: Limited     Judgment: Fair     MEDICATIONS:       Current Facility-Administered Medications:   •  acetaminophen (TYLENOL) tablet 650 mg, 650 mg, Oral, Q4H PRN, Juan Jose Laguna MD  •  aluminum-magnesium hydroxide-simethicone (MAALOX MAX) 400-400-40 MG/5ML suspension 15 mL, 15 mL, Oral, Q6H PRN, Juan Jose Laguna MD  •  LORazepam (ATIVAN) tablet 2 mg, 2 mg, Oral, Q4H PRN **AND** haloperidol (HALDOL) tablet 5 mg, 5 mg, Oral, Q4H PRN **AND** diphenhydrAMINE (BENADRYL) capsule 50 mg, 50 mg, Oral, Q4H PRN, Juan Jose Laguna MD  •  haloperidol lactate (HALDOL) injection 5 mg, 5 mg, Intramuscular, Q4H PRN, 5 mg at 09/14/22  2231 **AND** LORazepam (ATIVAN) injection 2 mg, 2 mg, Intramuscular, Q4H PRN, 2 mg at 09/14/22 2231 **AND** diphenhydrAMINE (BENADRYL) injection 50 mg, 50 mg, Intramuscular, Q4H PRN, Juan Jose Laguna MD, 50 mg at 09/14/22 2231  •  hydrOXYzine pamoate (VISTARIL) capsule 50 mg, 50 mg, Oral, Q6H PRN, Juan Jose Laguna MD  •  loperamide (IMODIUM) capsule 2 mg, 2 mg, Oral, Q2H PRN, Juan Jose Laguna MD  •  magnesium hydroxide (MILK OF MAGNESIA) suspension 10 mL, 10 mL, Oral, Daily PRN, Juan Jose Laguna MD  •  nicotine (NICODERM CQ) 21 MG/24HR patch 1 patch, 1 patch, Transdermal, Q24H, Juan Jose Laguna MD  •  risperiDONE (risperDAL) tablet 3 mg, 3 mg, Oral, BID, Juan Jose Laguna MD, 3 mg at 09/16/22 0852  •  risperiDONE (risperDAL) tablet 3 mg, 3 mg, Oral, Once, Juan Jose Laguna MD  •  traZODone (DESYREL) tablet 100 mg, 100 mg, Oral, Nightly PRN, Juan Jose Laguna MD, 100 mg at 09/15/22 2325     Reviewed     VITAL SIGNS:     BP: (128-138)/(84-91) 128/91 Heart Rate:  [] 111 Device (Oxygen Therapy): room air Resp:  [16-18] 16     Temp:  [98.2 °F (36.8 °C)-98.5 °F (36.9 °C)] 98.2 °F (36.8 °C) Temp:  [98.2 °F (36.8 °C)-98.5 °F (36.9 °C)] 98.2 °F (36.8 °C)  Heart Rate:  [] 111  Resp:  [16-18] 16  BP: (128-138)/(84-91) 128/91     Reviewed     REVIEW OF SYSTEMS:     Patient denies any physical complaints currently. He denies fever, chills, cough, shortness of air, vision changes, speech changes, headache, sore throat, chest pain, chest palpitations, shortness of air, nausea, vomiting, abdominal pain, diarrhea, constipation, numbness, tingling, weakness, rash, difficulty walking, genitourinary symptoms.     LABORATORY RESULTS:     REVIEWED      ASSESSMENT/PLAN:     Unspecified psychosis     -Continue hospitalization for safety and stabilization.     -Continue current medications, which patient appears to be tolerating.      -I provided patient with brief, supportive therapy.     -Continue to assess patient's risks. Continue current monitoring as  patient denies any suicidal ideation and reports improvement in mood.    -Repeat CMP. Consider hospitalist consult if liver enzymes, creatinine remain very elevated. Patient appears asymptomatic currently.      -Continue to monitor.     -Continue discharge planning.

## 2022-09-16 NOTE — PLAN OF CARE
Goal Outcome Evaluation:  Plan of Care Reviewed With: patient  Patient Agreement with Plan of Care: agrees     Progress: improving.  Patient slept during initial portion of shift, getting up at 2230.  Patient came up to nursing station and requested his pm medications and something to help him sleep.  Patient's speech was of regular tone, and volume.  Patient requested to have items for a shower in the morning and asked for a snack.  Patient denies S/I, H/I and CFS.  Patient states he is not having A/V hallucinations at present.  Patient's insight is very limited, but he is able to be pleasant and less guarded/suspicious of staff.  Patient ate snack, sat up in room and is sleeping at the time of this note.  Positive reinforcement provided.  Safe environment maintained.

## 2022-09-16 NOTE — CASE MANAGEMENT/SOCIAL WORK
"Met with pt briefly, scaled mood as \"I am better.\" receptive to speaking briefly with Therapist. Pt is calm, engaged, friendly. No signs of mood elevation during session. Pt is asked for today's date and receptive to meeting with Therapist next week.   " written material/verbal instruction

## 2022-09-17 RX ADMIN — RISPERIDONE 3 MG: 3 TABLET ORAL at 08:34

## 2022-09-17 RX ADMIN — TRAZODONE HYDROCHLORIDE 100 MG: 50 TABLET ORAL at 20:21

## 2022-09-17 RX ADMIN — RISPERIDONE 3 MG: 3 TABLET ORAL at 20:21

## 2022-09-17 NOTE — PROGRESS NOTES
[facility]     Psychiatric Progress Note    Patient Name: James Swanson  : 1980  MRN: [id number]  Primary Care Physician:  Provider, No Known  Date of admission: 2022    Subjective   Subjective     Chief Complaint:I am doing fine     HPI:     Patient seen and chart reviewed, discussed with staff. Pt. Has been calm and cooperative.  He is compliant with medications.  Upon my eval. He stated that he feels much better. He is catching up on his sleep.  He is tolerating his medications well.  No issues voiced.  His appetite is good.  He did not require any more PRN meds for agitation.    Objective   Objective     Vitals:   Temp:  [97.9 °F (36.6 °C)-98.1 °F (36.7 °C)] 97.9 °F (36.6 °C)  Heart Rate:  [] 102  Resp:  [18] 18  BP: (112-124)/(66-83) 124/83          Mental Status Exam:      Appearance:   In bed,sleepy  Reliability:  fair  Eye contact is good  Concentration/Focus:   improved  Behaviors:    Cooperative  Memory : Improved  Speech:    Clear and coherent  Language: Normal  Mood :   Fine  Affect:   Restricted  Thought process:   Linear  Thought Content:   Denies paranoia auditory or visual hallucination  Insight: Improved  Judgement:    Improved      Result Review   Result Review:  I have personally reviewed the results from the time of this admission to 2022 14:48 EDT and agree with these findings:  []  Laboratory  []  Microbiology  []  Radiology  []  EKG/Telemetry   []  Cardiology/Vascular   []  Pathology  []  Old records  []  Other:  Most notable findings include:       Medications:   nicotine, 1 patch, Transdermal, Q24H  risperiDONE, 3 mg, Oral, BID  risperiDONE, 3 mg, Oral, Once          Assessment / Plan       Active Hospital Problems:  Active Hospital Problems    Diagnosis    • Psychosis (HCC)        Plan:     Continue current medications and treatment.  Psychiatric medication as needed.  • Work on mood stabilization and abatement of any suicidal ideation or psychosis.  Work on  appropriate safety plan  • Continue supportive therapy  • Patient to engage in all group and individual treatment modalities available on the unit  • Obtain collateral information if possible  • Titrate medications as clinically indicated  • Work on appropriate disposition follow-up including referrals to substance abuse treatment if indicated      Disposition:  I expect patient to be discharged when stable.    Part of this note may be an electronic transcription/translation of spoken language to printed text using the Dragon dictation system.  Every attempt has been made to correct errors but some transcription errors may be he appears to be doing fine on the current medication.  In the body of the note.       Electronically signed by Michelle Nicole MD, 09/17/22, 2:48 PM EDT.

## 2022-09-17 NOTE — PLAN OF CARE
Goal Outcome Evaluation:  Plan of Care Reviewed With: patient  Patient Agreement with Plan of Care: agrees        Patient alert and oriented. Calm and cooperative. Denies SI/HI/AVH. Has been sleeping a lot today. Reports having nightmares last night and thinks it was the Trazadone. Dr. Nicole notifed. Rates depression and anxiety 6. Will continue to monitor.

## 2022-09-17 NOTE — PLAN OF CARE
"Goal Outcome Evaluation:  Plan of Care Reviewed With: patient  Patient Agreement with Plan of Care: agrees         Pt is alert, oriented, not in distress. Pt is calm and pleasant during assessment. Pt denies current SI, HI or A/V hallucinations. Pt rates his depression and anxiety 6/10 respectively. When asked how his day was, the patient replied \"it was okay. I was just catching up on my sleep. I haven't been sleeping.\" Pt was withdrawn to his room, coming out for snack time. Will continue to monitor this patient and provide a safe environment. -- AS RN  "

## 2022-09-18 RX ADMIN — TRAZODONE HYDROCHLORIDE 100 MG: 50 TABLET ORAL at 20:57

## 2022-09-18 RX ADMIN — RISPERIDONE 3 MG: 3 TABLET ORAL at 08:30

## 2022-09-18 RX ADMIN — RISPERIDONE 3 MG: 3 TABLET ORAL at 20:55

## 2022-09-18 RX ADMIN — ACETAMINOPHEN 650 MG: 325 TABLET ORAL at 20:54

## 2022-09-18 NOTE — PROGRESS NOTES
[facility]     Psychiatric Progress Note    Patient Name: James Swanson  : 1980  MRN: [id number]  Primary Care Physician:  Provider, No Known  Date of admission: 2022    Subjective   Subjective     Chief Complaint: I am doing fine    HPI:     Patient seen and chart reviewed, discussed with staff.  Patient has been calm in his presentation.  He did not require any as needed medication.  Upon my evaluation patient stated that he is doing fine.  His medications are working for him.  Patient did attend 1 group yesterday.  He has been tolerating medications well his appetite is good.  He stated that he is picking up on his sleep.  I encouraged him to come out of his room and participate in his treatment more often and he is agreeable to that.  He is making progressive improvement.      Objective   Objective     Vitals:   Temp:  [97.7 °F (36.5 °C)-97.8 °F (36.6 °C)] 97.8 °F (36.6 °C)  Heart Rate:  [91-93] 93  Resp:  [18] 18  BP: (121-131)/(80-86) 131/86        Mental Status Exam:      Appearance: In his bed calm and cooperative  Reliability:   Fair  Eye Contact: Good  Concentration/Focus:   Improved  Behaviors:   Cooperative  Memory :    Improved  Speech:    Clear soft low volume  Language:   Normal  Mood :   Fine  Affect:   Restricted  Thought process:   Linear and concrete  Thought Content: Denies paranoia auditory or visual hallucinations denies being suicidal or homicidal.  Insight:   Improved  Judgement:   Improved      Result Review    Result Review:  I have personally reviewed the results from the time of this admission to 2022 12:17 EDT and agree with these findings:  []  Laboratory  []  Microbiology  []  Radiology  []  EKG/Telemetry   []  Cardiology/Vascular   []  Pathology  []  Old records  []  Other:  Most notable findings include:       Medications:   nicotine, 1 patch, Transdermal, Q24H  risperiDONE, 3 mg, Oral, BID  risperiDONE, 3 mg, Oral, Once          Assessment / Plan       Active  Hospital Problems:  Active Hospital Problems    Diagnosis    • Psychosis (HCC)        Plan:     • Continue current medication and monitor symptom resolution.  Patient is making progressive improvement on the current medication.  Will continue to reevaluate for any further titration.  • Work on mood stabilization and abatement of any suicidal ideation or psychosis.  Work on appropriate safety plan  • Continue supportive therapy  • Patient to engage in all group and individual treatment modalities available on the unit  • Obtain collateral information if possible  • Titrate medications as clinically indicated  • Work on appropriate disposition follow-up including referrals to substance abuse treatment if indicated      Disposition:  I expect patient to be discharged 2-3 days    Part of this note may be an electronic transcription/translation of spoken language to printed text using the Dragon dictation system.  Every attempt has been made to correct errors but some transcription errors may be present in the body of the note.       Electronically signed by Michelle Nicole MD, 09/18/22, 12:17 PM EDT.

## 2022-09-18 NOTE — PLAN OF CARE
Goal Outcome Evaluation:  Plan of Care Reviewed With: patient  Patient Agreement with Plan of Care: agrees        Patient alert and oriented. Calm and cooperative. Denies SI/HI/AVH. Rates anxiety and depression 6. Compliant with medications. Attended group sessions.

## 2022-09-19 VITALS
HEIGHT: 64 IN | WEIGHT: 157.63 LBS | HEART RATE: 90 BPM | RESPIRATION RATE: 16 BRPM | TEMPERATURE: 97.9 F | SYSTOLIC BLOOD PRESSURE: 134 MMHG | OXYGEN SATURATION: 100 % | BODY MASS INDEX: 26.91 KG/M2 | DIASTOLIC BLOOD PRESSURE: 91 MMHG

## 2022-09-19 PROBLEM — R41.9 NEUROCOGNITIVE DISORDER: Status: ACTIVE | Noted: 2022-09-19

## 2022-09-19 RX ORDER — RISPERIDONE 3 MG/1
3 TABLET ORAL 2 TIMES DAILY
Qty: 60 TABLET | Refills: 1 | Status: SHIPPED | OUTPATIENT
Start: 2022-09-19

## 2022-09-19 RX ORDER — TRAZODONE HYDROCHLORIDE 100 MG/1
100 TABLET ORAL NIGHTLY PRN
Qty: 30 TABLET | Refills: 1 | Status: SHIPPED | OUTPATIENT
Start: 2022-09-19

## 2022-09-19 RX ADMIN — ACETAMINOPHEN 650 MG: 325 TABLET ORAL at 08:27

## 2022-09-19 RX ADMIN — RISPERIDONE 3 MG: 3 TABLET ORAL at 08:27

## 2022-09-19 NOTE — DISCHARGE SUMMARY
[facility]         DISCHARGE SUMMARY    Patient Name: James Swanson  : 1980  MRN: [id number]    Date of Admission: 2022  Date of Discharge: 2022  Primary Care Physician: Provider, No Known    Consults     No orders found from 2022 to 9/15/2022.          Presenting Problem:   Psychosis (HCC) [F29]    Active and Resolved Hospital Problems:  Active Hospital Problems    Diagnosis POA   • Psychosis (HCC) [F29] Yes      Resolved Hospital Problems   No resolved problems to display.         Hospital Course     Hospital Course:  James Swanson is a 41 y.o. male admitted on a mental Inquest warrant for bizarre behavior.  Patient not been sleeping, has been paranoid including not wanting to go into her apartment, had decreased appetite, and overall not doing well.  He has not been on medication for some time.    He was started on Risperdal when he came into the hospital was maintained on 3 mg twice daily.  This medication has been very effective in his mood and affect is stabilized.  He has had an improvement in his sleep with use of trazodone.  On day of discharge staff reports the patient has been calm and cooperative.  He been compliant with treatment medications.  His paranoia dissipated and he is no longer expressing delusional thinking.  He is agreeable to outpatient follow-up.    He describes his mood today as okay is denying any suicidal or homicidal ideation.  The patient is future oriented goal-directed stating that he needs to get back to work and that he has called [employer], his employer, and still has his job.  He acknowledges that may be leaving his apartment but is hopeful to find another apartment neurologic that he has worked very in order to find another place to live and is expressing realistic expectations.  He denies any suicidal or homicidal ideations.  He reports the voices he had been hearing of left and not bothering him.  Reports his mood is great.  Reports  he is been sleeping well.  Reports no side effects from the medication.  We discussed use of a long-acting injectable and he declines.    Date of discharge he is calm and cooperative.  There is no psychomotor restlessness or agitation.  He maintains good eye contact.  He reports his mood overall is good.  Thought processes are sequential and goal directed.  Patient is pleasant and engaging makes good eye contact and is agreeable to outpatient follow-up medication.  Stable and asking for discharge and may be discharged at this time        DISCHARGE Follow Up Recommendations for labs and diagnostics: [facility], primary care is needed, lipid and glucose monitoring      Day of Discharge     Vital Signs:  Temp:  [97.9 °F (36.6 °C)-98 °F (36.7 °C)] 97.9 °F (36.6 °C)  Heart Rate:  [75-90] 90  Resp:  [16] 16  BP: (132-134)/(76-91) 134/91      Pertinent  and/or Most Recent Results     LAB RESULTS:      Lab 09/15/22  0414   WBC 9.80   HEMOGLOBIN 13.2   HEMATOCRIT 41.3   PLATELETS 179   NEUTROS ABS 8.06*   IMMATURE GRANS (ABS) 0.03   LYMPHS ABS 0.93   MONOS ABS 0.76   EOS ABS 0.00   MCV 95.2         Lab 09/16/22  1453 09/15/22  0414   SODIUM 136 139   POTASSIUM 3.8 3.9   CHLORIDE 100 99   CO2 26.1 23.6   ANION GAP 9.9 16.4*   BUN 15 23*   CREATININE 1.30* 1.92*   EGFR 71.7 44.9*   GLUCOSE 120* 129*   CALCIUM 9.2 10.0   TSH  --  1.300         Lab 09/16/22  1453 09/15/22  0414   TOTAL PROTEIN 6.7 8.1   ALBUMIN 3.50 4.60   GLOBULIN 3.2 3.5   ALT (SGPT) 160* 304*   AST (SGOT) 152* 264*   BILIRUBIN 0.5 1.4*   ALK PHOS 63 59                                     Lab 09/15/22  0414   ETHANOL PCT <0.010   ETHANOL MGDL <10         Brief Urine Lab Results     None                                Imaging Results (Last 7 Days)     ** No results found for the last 168 hours. **           Labs Pending at Discharge:           Discharge Details        Discharge Medications      New Medications      Instructions Start Date   traZODone 100 MG  tablet  Commonly known as: DESYREL   100 mg, Oral, Nightly PRN         Continue These Medications      Instructions Start Date   risperiDONE 3 MG tablet  Commonly known as: risperDAL   3 mg, Oral, 2 Times Daily         Stop These Medications    hydrOXYzine 50 MG tablet  Commonly known as: ATARAX            No Known Allergies      Discharge Disposition:  Home or Self Care    Diet:  Hospital:  Diet Order   Procedures   • Diet Regular         Discharge Activity:   Activity Instructions     Activity as Tolerated            Discharge Condition: Stable    CODE STATUS:  Code Status and Medical Interventions:   Ordered at: 09/14/22 9029     Code Status (Patient has no pulse and is not breathing):    CPR (Attempt to Resuscitate)     Medical Interventions (Patient has pulse or is breathing):    Full Support         No future appointments.    Additional Instructions for the Follow-ups that You Need to Schedule     Discharge Follow-up with PCP   As directed       Currently Documented PCP:    Provider, No Known    PCP Phone Number:    None     Follow Up Details: as scheduled         Discharge Follow-up with Specified Provider: [facility]   As directed      To: [facility]               Time spent on Discharge including face to face service: 30 minutes    Part of this note may be an electronic transcription/translation of spoken language to printed text using the Dragon dictation system.  Every attempt has been made to correct errors but some transcription errors may be present in the body of the note.    Electronically signed by Juan Jose Laguna MD, 09/19/22, 6:11 PM EDT.

## 2022-09-19 NOTE — PLAN OF CARE
Goal Outcome Evaluation:  Plan of Care Reviewed With: patient  Patient Agreement with Plan of Care: agrees  PATIENT HAS REACHED HIS GOALS AND WILL BE DISCHARGED FROM UNIT. PATIENT TO CONTINUE TREATMENT ON OUTPATIENT BASIS.

## 2022-09-19 NOTE — PLAN OF CARE
Goal Outcome Evaluation:  Plan of Care Reviewed With: patient  Patient Agreement with Plan of Care: agrees  Patient comes to the nurses station for his needs. Mood is quiet affect is flat. Pt is difficult to engage in conversation. Denies suicidal and homicidal ideations,denies hallucinations. Cooperative with taking night medications.Treatment plan is ongoing.